# Patient Record
Sex: FEMALE
[De-identification: names, ages, dates, MRNs, and addresses within clinical notes are randomized per-mention and may not be internally consistent; named-entity substitution may affect disease eponyms.]

---

## 2018-05-21 ENCOUNTER — APPOINTMENT (OUTPATIENT)
Dept: UROLOGY | Facility: CLINIC | Age: 33
End: 2018-05-21

## 2020-06-16 ENCOUNTER — APPOINTMENT (OUTPATIENT)
Dept: INTERNAL MEDICINE | Facility: CLINIC | Age: 35
End: 2020-06-16
Payer: COMMERCIAL

## 2020-06-16 VITALS
HEIGHT: 68 IN | TEMPERATURE: 98.7 F | HEART RATE: 86 BPM | BODY MASS INDEX: 30.01 KG/M2 | DIASTOLIC BLOOD PRESSURE: 84 MMHG | SYSTOLIC BLOOD PRESSURE: 118 MMHG | WEIGHT: 198 LBS | RESPIRATION RATE: 16 BRPM | OXYGEN SATURATION: 97 %

## 2020-06-16 DIAGNOSIS — F19.11 OTHER PSYCHOACTIVE SUBSTANCE ABUSE, IN REMISSION: ICD-10-CM

## 2020-06-16 PROCEDURE — 99385 PREV VISIT NEW AGE 18-39: CPT

## 2020-06-16 RX ORDER — SERTRALINE HYDROCHLORIDE 100 MG/1
100 TABLET, FILM COATED ORAL DAILY
Refills: 0 | Status: ACTIVE | COMMUNITY

## 2020-06-16 NOTE — ASSESSMENT
[FreeTextEntry1] : 1.anxiety disorder with history of substance abuse: Continue on Zoloft 150 mg daily, follow up with psychiatry. Continue seeing therapist.\par \par 2.Infection with novel 2019 coronavirus: Now recovered. Patient will go for antibody testing tomorrow.\par \par 3.psoriasis: Continue on tacrolimus and estrogen cream per Derm. \par \par 4.health maintenance: Discussed blood work to get.

## 2020-06-16 NOTE — HISTORY OF PRESENT ILLNESS
[de-identified] : Patient is a 35-year-old female history of anxiety disorder, panic attacks, history of substance abuse, psoriasis, recent covid 19 infection comes in to establish care. 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 In early April she had symptoms of fever, fatigue, headache as well as stomach upset. Her symptoms lasted 9 days and she was able to recover at home.\par Her  also has a history of substance abuse and he recently had a relapse which is causing a great amount of stress for her at home. She also helps to take care of her stepdaughter who is 13 years old. Patient does continue to go to her support meetings and sees a therapist on a weekly basis. She also follow with a psychiatrist Dr. Fritz Rankin as well as a dermatologist Dr. Kristina Llanes. Patient recently had a flare of psoriasis on her vagina.\par Patient notes a 30 pound weight gain over the last 2 years. She states she noticed more of the weight gain after she started taking Zoloft. However she does admit to a lack of exercise and uncontrolled diet at times\par Patient denies any cp, sob,abdominal pain, nausea, vomiting, palpitations, fever, chills, constipation, diarrhea.\par  [FreeTextEntry1] : Patient comes in to establish care and annual exam.\par

## 2020-06-16 NOTE — HEALTH RISK ASSESSMENT
[No] : In the past 12 months have you used drugs other than those required for medical reasons? No [0] : 1) Little interest or pleasure doing things: Not at all (0) [Smoke Detector] : smoke detector [Carbon Monoxide Detector] : carbon monoxide detector [Safety elements used in home] : safety elements used in home [Seat Belt] :  uses seat belt [Sunscreen] : uses sunscreen [Patient reported PAP Smear was normal] : Patient reported PAP Smear was normal [] :  [# Of Children ___] : has [unfilled] children [Fully functional (bathing, dressing, toileting, transferring, walking, feeding)] : Fully functional (bathing, dressing, toileting, transferring, walking, feeding) [Fully functional (using the telephone, shopping, preparing meals, housekeeping, doing laundry, using] : Fully functional and needs no help or supervision to perform IADLs (using the telephone, shopping, preparing meals, housekeeping, doing laundry, using transportation, managing medications and managing finances) [] : No [MLV2Lfghh] : 0 [PapSmearDate] : 2019 [Guns at Home] : no guns at home [FreeTextEntry3] : 1 step daughter.

## 2020-06-16 NOTE — PAST MEDICAL HISTORY
[Menarche Age ____] : age at menarche was [unfilled] [Definite ___ (Date)] : the last menstrual period was [unfilled] [Regular Cycle Intervals] : have been regular [Total Preg ___] : G[unfilled] [Abortions ___] : Abortions:[unfilled] [Living ___] : Living: [unfilled]

## 2020-10-05 ENCOUNTER — APPOINTMENT (OUTPATIENT)
Dept: OBGYN | Facility: CLINIC | Age: 35
End: 2020-10-05
Payer: COMMERCIAL

## 2020-10-05 VITALS
BODY MASS INDEX: 28.04 KG/M2 | WEIGHT: 185 LBS | HEIGHT: 68 IN | SYSTOLIC BLOOD PRESSURE: 110 MMHG | DIASTOLIC BLOOD PRESSURE: 70 MMHG

## 2020-10-05 DIAGNOSIS — N97.9 FEMALE INFERTILITY, UNSPECIFIED: ICD-10-CM

## 2020-10-05 DIAGNOSIS — Z01.419 ENCOUNTER FOR GYNECOLOGICAL EXAMINATION (GENERAL) (ROUTINE) W/OUT ABNORMAL FINDINGS: ICD-10-CM

## 2020-10-05 PROCEDURE — 99385 PREV VISIT NEW AGE 18-39: CPT

## 2020-10-06 LAB — HPV HIGH+LOW RISK DNA PNL CVX: NOT DETECTED

## 2020-10-08 ENCOUNTER — APPOINTMENT (OUTPATIENT)
Dept: ULTRASOUND IMAGING | Facility: CLINIC | Age: 35
End: 2020-10-08

## 2020-10-08 ENCOUNTER — OUTPATIENT (OUTPATIENT)
Dept: OUTPATIENT SERVICES | Facility: HOSPITAL | Age: 35
LOS: 1 days | End: 2020-10-08
Payer: COMMERCIAL

## 2020-10-08 DIAGNOSIS — N97.9 FEMALE INFERTILITY, UNSPECIFIED: ICD-10-CM

## 2020-10-08 PROCEDURE — 76830 TRANSVAGINAL US NON-OB: CPT | Mod: 26

## 2020-10-08 PROCEDURE — 76830 TRANSVAGINAL US NON-OB: CPT

## 2020-10-28 ENCOUNTER — TRANSCRIPTION ENCOUNTER (OUTPATIENT)
Age: 35
End: 2020-10-28

## 2020-10-29 LAB
ESTRADIOL SERPL-MCNC: 48 PG/ML
FSH SERPL-MCNC: 3.8 IU/L
HCG SERPL-MCNC: <1 MIU/ML
PROLACTIN SERPL-MCNC: 31.6 NG/ML
TSH SERPL-ACNC: 2.48 UIU/ML

## 2020-11-03 LAB — ANTI-MUELLERIAN HORMONE: 1.58 NG/ML

## 2020-11-04 ENCOUNTER — OUTPATIENT (OUTPATIENT)
Dept: OUTPATIENT SERVICES | Facility: HOSPITAL | Age: 35
LOS: 1 days | End: 2020-11-04

## 2020-11-04 ENCOUNTER — APPOINTMENT (OUTPATIENT)
Dept: INTERVENTIONAL RADIOLOGY/VASCULAR | Facility: CLINIC | Age: 35
End: 2020-11-04
Payer: COMMERCIAL

## 2020-11-04 PROCEDURE — 58340 CATHETER FOR HYSTEROGRAPHY: CPT

## 2020-11-04 PROCEDURE — 74740 X-RAY FEMALE GENITAL TRACT: CPT | Mod: 26

## 2020-11-09 ENCOUNTER — NON-APPOINTMENT (OUTPATIENT)
Age: 35
End: 2020-11-09

## 2020-11-17 ENCOUNTER — NON-APPOINTMENT (OUTPATIENT)
Age: 35
End: 2020-11-17

## 2020-11-23 ENCOUNTER — NON-APPOINTMENT (OUTPATIENT)
Age: 35
End: 2020-11-23

## 2020-11-23 ENCOUNTER — APPOINTMENT (OUTPATIENT)
Dept: OBGYN | Facility: CLINIC | Age: 35
End: 2020-11-23
Payer: COMMERCIAL

## 2020-11-23 VITALS
WEIGHT: 185 LBS | HEIGHT: 68 IN | BODY MASS INDEX: 28.04 KG/M2 | SYSTOLIC BLOOD PRESSURE: 118 MMHG | DIASTOLIC BLOOD PRESSURE: 70 MMHG

## 2020-11-23 DIAGNOSIS — N97.9 FEMALE INFERTILITY, UNSPECIFIED: ICD-10-CM

## 2020-11-23 PROCEDURE — 99213 OFFICE O/P EST LOW 20 MIN: CPT

## 2020-12-03 ENCOUNTER — NON-APPOINTMENT (OUTPATIENT)
Age: 35
End: 2020-12-03

## 2020-12-04 DIAGNOSIS — R79.89 OTHER SPECIFIED ABNORMAL FINDINGS OF BLOOD CHEMISTRY: ICD-10-CM

## 2020-12-23 PROBLEM — Z01.419 ENCOUNTER FOR ANNUAL ROUTINE GYNECOLOGICAL EXAMINATION: Status: RESOLVED | Noted: 2020-10-05 | Resolved: 2020-12-23

## 2021-02-01 ENCOUNTER — APPOINTMENT (OUTPATIENT)
Dept: OBGYN | Facility: CLINIC | Age: 36
End: 2021-02-01

## 2021-02-09 ENCOUNTER — APPOINTMENT (OUTPATIENT)
Dept: INTERNAL MEDICINE | Facility: CLINIC | Age: 36
End: 2021-02-09
Payer: COMMERCIAL

## 2021-02-09 ENCOUNTER — NON-APPOINTMENT (OUTPATIENT)
Age: 36
End: 2021-02-09

## 2021-02-09 ENCOUNTER — EMERGENCY (EMERGENCY)
Facility: HOSPITAL | Age: 36
LOS: 0 days | Discharge: HOME | End: 2021-02-09
Attending: EMERGENCY MEDICINE
Payer: COMMERCIAL

## 2021-02-09 VITALS
DIASTOLIC BLOOD PRESSURE: 76 MMHG | OXYGEN SATURATION: 99 % | TEMPERATURE: 98 F | SYSTOLIC BLOOD PRESSURE: 104 MMHG | RESPIRATION RATE: 16 BRPM | HEART RATE: 75 BPM

## 2021-02-09 VITALS — WEIGHT: 190.04 LBS | HEIGHT: 68 IN

## 2021-02-09 DIAGNOSIS — R20.2 PARESTHESIA OF SKIN: ICD-10-CM

## 2021-02-09 DIAGNOSIS — R52 PAIN, UNSPECIFIED: ICD-10-CM

## 2021-02-09 DIAGNOSIS — Z86.2 PERSONAL HISTORY OF DISEASES OF THE BLOOD AND BLOOD-FORMING ORGANS AND CERTAIN DISORDERS INVOLVING THE IMMUNE MECHANISM: ICD-10-CM

## 2021-02-09 DIAGNOSIS — R51.9 HEADACHE, UNSPECIFIED: ICD-10-CM

## 2021-02-09 DIAGNOSIS — R11.0 NAUSEA: ICD-10-CM

## 2021-02-09 DIAGNOSIS — E86.0 DEHYDRATION: ICD-10-CM

## 2021-02-09 DIAGNOSIS — N92.0 EXCESSIVE AND FREQUENT MENSTRUATION WITH REGULAR CYCLE: ICD-10-CM

## 2021-02-09 DIAGNOSIS — D64.9 ANEMIA, UNSPECIFIED: ICD-10-CM

## 2021-02-09 DIAGNOSIS — R00.2 PALPITATIONS: ICD-10-CM

## 2021-02-09 DIAGNOSIS — Z20.822 CONTACT WITH AND (SUSPECTED) EXPOSURE TO COVID-19: ICD-10-CM

## 2021-02-09 DIAGNOSIS — D35.2 BENIGN NEOPLASM OF PITUITARY GLAND: ICD-10-CM

## 2021-02-09 LAB
ALBUMIN SERPL ELPH-MCNC: 4.1 G/DL — SIGNIFICANT CHANGE UP (ref 3.3–5)
ALP SERPL-CCNC: 61 U/L — SIGNIFICANT CHANGE UP (ref 40–120)
ALT FLD-CCNC: 21 U/L — SIGNIFICANT CHANGE UP (ref 12–78)
ANION GAP SERPL CALC-SCNC: 6 MMOL/L — SIGNIFICANT CHANGE UP (ref 5–17)
APTT BLD: 32.6 SEC — SIGNIFICANT CHANGE UP (ref 27.5–35.5)
AST SERPL-CCNC: 13 U/L — LOW (ref 15–37)
BASOPHILS # BLD AUTO: 0.04 K/UL — SIGNIFICANT CHANGE UP (ref 0–0.2)
BASOPHILS NFR BLD AUTO: 0.7 % — SIGNIFICANT CHANGE UP (ref 0–2)
BILIRUB SERPL-MCNC: 0.4 MG/DL — SIGNIFICANT CHANGE UP (ref 0.2–1.2)
BUN SERPL-MCNC: 12 MG/DL — SIGNIFICANT CHANGE UP (ref 7–23)
CALCIUM SERPL-MCNC: 8.9 MG/DL — SIGNIFICANT CHANGE UP (ref 8.5–10.1)
CHLORIDE SERPL-SCNC: 108 MMOL/L — SIGNIFICANT CHANGE UP (ref 96–108)
CO2 SERPL-SCNC: 25 MMOL/L — SIGNIFICANT CHANGE UP (ref 22–31)
CREAT SERPL-MCNC: 0.91 MG/DL — SIGNIFICANT CHANGE UP (ref 0.5–1.3)
EOSINOPHIL # BLD AUTO: 0.09 K/UL — SIGNIFICANT CHANGE UP (ref 0–0.5)
EOSINOPHIL NFR BLD AUTO: 1.6 % — SIGNIFICANT CHANGE UP (ref 0–6)
GLUCOSE SERPL-MCNC: 95 MG/DL — SIGNIFICANT CHANGE UP (ref 70–99)
HCG SERPL-ACNC: <1 MIU/ML — SIGNIFICANT CHANGE UP
HCT VFR BLD CALC: 38.5 % — SIGNIFICANT CHANGE UP (ref 34.5–45)
HGB BLD-MCNC: 12.7 G/DL — SIGNIFICANT CHANGE UP (ref 11.5–15.5)
IMM GRANULOCYTES NFR BLD AUTO: 0.5 % — SIGNIFICANT CHANGE UP (ref 0–1.5)
INR BLD: 1.07 RATIO — SIGNIFICANT CHANGE UP (ref 0.88–1.16)
LYMPHOCYTES # BLD AUTO: 1.85 K/UL — SIGNIFICANT CHANGE UP (ref 1–3.3)
LYMPHOCYTES # BLD AUTO: 33.5 % — SIGNIFICANT CHANGE UP (ref 13–44)
MCHC RBC-ENTMCNC: 28.7 PG — SIGNIFICANT CHANGE UP (ref 27–34)
MCHC RBC-ENTMCNC: 33 GM/DL — SIGNIFICANT CHANGE UP (ref 32–36)
MCV RBC AUTO: 86.9 FL — SIGNIFICANT CHANGE UP (ref 80–100)
MONOCYTES # BLD AUTO: 0.38 K/UL — SIGNIFICANT CHANGE UP (ref 0–0.9)
MONOCYTES NFR BLD AUTO: 6.9 % — SIGNIFICANT CHANGE UP (ref 2–14)
NEUTROPHILS # BLD AUTO: 3.13 K/UL — SIGNIFICANT CHANGE UP (ref 1.8–7.4)
NEUTROPHILS NFR BLD AUTO: 56.8 % — SIGNIFICANT CHANGE UP (ref 43–77)
PLATELET # BLD AUTO: 267 K/UL — SIGNIFICANT CHANGE UP (ref 150–400)
POTASSIUM SERPL-MCNC: 3.9 MMOL/L — SIGNIFICANT CHANGE UP (ref 3.5–5.3)
POTASSIUM SERPL-SCNC: 3.9 MMOL/L — SIGNIFICANT CHANGE UP (ref 3.5–5.3)
PROT SERPL-MCNC: 8.1 GM/DL — SIGNIFICANT CHANGE UP (ref 6–8.3)
PROTHROM AB SERPL-ACNC: 12.5 SEC — SIGNIFICANT CHANGE UP (ref 10.6–13.6)
RBC # BLD: 4.43 M/UL — SIGNIFICANT CHANGE UP (ref 3.8–5.2)
RBC # FLD: 12.7 % — SIGNIFICANT CHANGE UP (ref 10.3–14.5)
SARS-COV-2 RNA SPEC QL NAA+PROBE: SIGNIFICANT CHANGE UP
SODIUM SERPL-SCNC: 139 MMOL/L — SIGNIFICANT CHANGE UP (ref 135–145)
WBC # BLD: 5.52 K/UL — SIGNIFICANT CHANGE UP (ref 3.8–10.5)
WBC # FLD AUTO: 5.52 K/UL — SIGNIFICANT CHANGE UP (ref 3.8–10.5)

## 2021-02-09 PROCEDURE — 85025 COMPLETE CBC W/AUTO DIFF WBC: CPT

## 2021-02-09 PROCEDURE — 36415 COLL VENOUS BLD VENIPUNCTURE: CPT

## 2021-02-09 PROCEDURE — 99284 EMERGENCY DEPT VISIT MOD MDM: CPT

## 2021-02-09 PROCEDURE — 93010 ELECTROCARDIOGRAM REPORT: CPT

## 2021-02-09 PROCEDURE — 99283 EMERGENCY DEPT VISIT LOW MDM: CPT | Mod: 25

## 2021-02-09 PROCEDURE — 85610 PROTHROMBIN TIME: CPT

## 2021-02-09 PROCEDURE — 86850 RBC ANTIBODY SCREEN: CPT

## 2021-02-09 PROCEDURE — 96361 HYDRATE IV INFUSION ADD-ON: CPT

## 2021-02-09 PROCEDURE — 80053 COMPREHEN METABOLIC PANEL: CPT

## 2021-02-09 PROCEDURE — U0005: CPT

## 2021-02-09 PROCEDURE — 96360 HYDRATION IV INFUSION INIT: CPT

## 2021-02-09 PROCEDURE — 86900 BLOOD TYPING SEROLOGIC ABO: CPT

## 2021-02-09 PROCEDURE — 86901 BLOOD TYPING SEROLOGIC RH(D): CPT

## 2021-02-09 PROCEDURE — 99213 OFFICE O/P EST LOW 20 MIN: CPT | Mod: 95

## 2021-02-09 PROCEDURE — U0003: CPT

## 2021-02-09 PROCEDURE — 85730 THROMBOPLASTIN TIME PARTIAL: CPT

## 2021-02-09 PROCEDURE — 93005 ELECTROCARDIOGRAM TRACING: CPT

## 2021-02-09 PROCEDURE — 84702 CHORIONIC GONADOTROPIN TEST: CPT

## 2021-02-09 RX ORDER — SODIUM CHLORIDE 9 MG/ML
1000 INJECTION INTRAMUSCULAR; INTRAVENOUS; SUBCUTANEOUS ONCE
Refills: 0 | Status: COMPLETED | OUTPATIENT
Start: 2021-02-09 | End: 2021-02-09

## 2021-02-09 RX ADMIN — SODIUM CHLORIDE 1000 MILLILITER(S): 9 INJECTION INTRAMUSCULAR; INTRAVENOUS; SUBCUTANEOUS at 15:29

## 2021-02-09 RX ADMIN — SODIUM CHLORIDE 2000 MILLILITER(S): 9 INJECTION INTRAMUSCULAR; INTRAVENOUS; SUBCUTANEOUS at 13:31

## 2021-02-09 RX ADMIN — SODIUM CHLORIDE 1000 MILLILITER(S): 9 INJECTION INTRAMUSCULAR; INTRAVENOUS; SUBCUTANEOUS at 14:29

## 2021-02-09 NOTE — ED STATDOCS - NSFOLLOWUPINSTRUCTIONS_ED_ALL_ED_FT
Dehydration    AMBULATORY CARE:    Dehydration is a condition that develops when your body does not have enough fluid. You may become dehydrated if you do not drink enough water or lose too much fluid. Fluid loss may also cause loss of electrolytes (minerals), such as sodium.    Common symptoms include the following:   •Dry eyes or mouth      •Increased thirst      •Dark yellow urine      •Urinating little or not at all      •Tiredness or body weakness      •Headache, dizziness, or confusion      •Irregular or fast breathing, fast or pounding heartbeat, and low blood pressure      •Sudden weight loss      Seek care immediately if:   •You have a seizure.      •You are confused or cannot think clearly.      •You are extremely sleepy, or another person cannot wake you.       •You become dizzy or faint when you stand.      •You are not able to urinate.      •You have trouble breathing.      •You have a fast or irregular heartbeat.      •Your hands or feet are cold, or your face is pale.       Contact your healthcare provider if:   •You have trouble drinking liquids because you are vomiting.      •Your symptoms get worse.      •You have a fever.       •You feel very weak or tired.      •You have questions or concerns about your condition or care.      Treatment for dehydration may include any of the following:   •Oral liquids: ?If you are mildly to moderately dehydrated, you may need an oral rehydration solution (ORS). This drink contains the right amount of salt, sugar, and minerals in water to replace body fluids. Ask your healthcare provider where you can get an ORS.       ?Drink an ORS in small amounts if you have been vomiting. If you vomit, wait 30 minutes and try again. Ask healthcare providers how much ORS you need when you are dehydrated and how often you should drink it.       ?A sports drink is not  the same as an ORS. Do not drink sports drinks without asking your healthcare provider.      ?Do not drink soft drinks or fruit juices. These can make your condition worse.       •You may receive fluid through an IV. Electrolytes may also be included in the fluid.      •Hypodermoclysis gives your body a large amount of water quickly. The water is given into the deepest layer of your skin. Ask your healthcare provider for more information about hypodermoclysis.      Prevent or manage dehydration:   •Drink liquids as directed. Liquids that contain water, sugar, and minerals can help your body hold in fluid and help prevent dehydration. Drink liquids throughout the day, not just when you feel thirsty. Men should drink about 3 liters (13 eight-ounce cups) of liquid each day. Women should drink about 2 liters (9 eight-ounce cups) of liquid each day. Drink even more liquid if you will be outdoors, in the sun for a long time, or exercising.       •Stay cool. Limit the time you spend outdoors during the hottest part of the day. Dress in lightweight clothes.       •Keep track of how often you urinate. If you urinate less than usual or your urine is darker, drink more liquids.      Follow up with your healthcare provider as directed: Write down your questions so you remember to ask them during your visits.

## 2021-02-09 NOTE — ED STATDOCS - ATTENDING CONTRIBUTION TO CARE
I, Zac Barker MD,  performed the initial face to face bedside interview with this patient regarding history of present illness, review of symptoms and relevant past medical, social and family history.  I completed an independent physical examination.  I was the initial provider who evaluated this patient. I have signed out the follow up of any pending tests (i.e. labs, radiological studies) to the ACP.  I have communicated the patient’s plan of care and disposition with the ACP.  The history, relevant review of systems, past medical and surgical history, medical decision making, and physical examination was documented by the scribe in my presence and I attest to the accuracy of the documentation.

## 2021-02-09 NOTE — ED ADULT NURSE NOTE - CHIEF COMPLAINT QUOTE
heavy vaginal bleeding x 4 days. Pt c/o dizziness, nausea, chest tightness, headache and bodyaches x 5 days. Denies abd pain, fever/chills.

## 2021-02-09 NOTE — ASSESSMENT
[FreeTextEntry1] : 1.dizziness/fatigue: With recently heavy menses. Discussed with patient that she could be anemic and that we will need to obtain CBC stat, recent hemoglobin stable done last summer. Discussed signs and symptoms to warrant urgent evaluation. Will discuss treatment options once cbc resulted. \par Rapid covid testing done on Friday at [facility] urgent care was negative. Discussed obtaining covid PCR nasal swab.

## 2021-02-09 NOTE — ED STATDOCS - PROGRESS NOTE DETAILS
Pt. is a 37 y/o female  presents to the ED sent by PMD to evaluate for anemia. Pt had recent heavy period that she needed to change a pad every 40 minutes for 2 days. States her period started on 02/01 and ended yesterday. Pt c/o body aches, hand tingling, headache, palpitations, and mild nausea.  Pt. sent for evaluation for anemia from telehealth appointment.  last H&H 11/37 on 2/4.  Jocelyn Gillespie PA-C H/H 12/38 improved since last week.  Pt. feeling very thirsty. Will add another liter of IVF.  Jocelyn Gillespie PA-C Labs unremarkable.  Pt. received 2l IVF,  Return precautions provided.  To follow with PMD.  Jocelyn Gillespie PA-C

## 2021-02-09 NOTE — ED STATDOCS - CARE PLAN
Principal Discharge DX:	Dehydration   Principal Discharge DX:	Dehydration  Secondary Diagnosis:	Menorrhagia with regular cycle

## 2021-02-09 NOTE — ED ADULT NURSE NOTE - OBJECTIVE STATEMENT
Pt presents to ED for dizziness, weakness, headache, and chest tightness x 5 days. Pt states that she has been having menorrhagia for the last year but this past month was even heavier. Her period started 2/1 and ended yesterday 2/8. Pt states she had a tele-med appt with her PCP who suggested she might be anemic and suggested she should come to the ER. Pt denies fever or any sick contacts.

## 2021-02-09 NOTE — ED STATDOCS - OBJECTIVE STATEMENT
37 y/o female recently dx with a pituitary tumor a few months ago placed on medication, presents to the ED sent by PMD to evaluate for anemia. Pt had recent heavy period that she needed to change a pad every 40 minutes for 2 days. States her period started on 02/01 and ended yesterday. Pt c/o body aches, hand tingling, headache, palpitations, and mild nausea. Had a tele health visit with PMD, was told she appears pale and sent to the ED. No hx of abd surgeries.   OBGYN: Dr. Elijah Ledesma

## 2021-02-09 NOTE — ED STATDOCS - CLINICAL SUMMARY MEDICAL DECISION MAKING FREE TEXT BOX
pt sent to ER to check for anemia, heavier bleeding than usual, no vaginal bleed for 24 hours, check labs pt sent to ER to check for anemia, heavier bleeding than usual, no vaginal bleed for 24 hours, check labs          Labs unremarkable.  Pt. received 1L NS.  Pt. to follow with PMD.  Return precautions provided.  Jocelyn Gillespie PA-C

## 2021-02-09 NOTE — ED STATDOCS - PATIENT PORTAL LINK FT
You can access the FollowMyHealth Patient Portal offered by [facility] by registering at the following website: [email or web address]. By joining [email or web address]’s FollowMyHealth portal, you will also be able to view your health information using other applications (apps) compatible with our system.

## 2021-02-09 NOTE — HISTORY OF PRESENT ILLNESS
[FreeTextEntry8] : Ms. DIYA IZAGUIRRE is a 36 year F who calls in for an acute visit.\par Patient is a 36 year old female calling in with complaints of body aches, headache, lethargy and dizziness. Her symptoms started since this past Thursday. She got her period last week around every first or second and it was much heavier bleeding. She had heaviest flow on February 6 she was going through one tampon every 45 minutes. Her bleeding stopped yesterday. She's had a regular and heavier menses for the past one year but it was never as heavy.\par She is going through fertility workup to try to conceive. She was also diagnosed with pituitary microadenoma on MRI of the brain and is now on Cabergoline 5 mg half a tablet twice a week for the past 2 months. No other new medications. \par Patient denies any cp, sob,abdominal pain, nausea, vomiting, palpitations, fever, chills, constipation, diarrhea.\par

## 2021-02-09 NOTE — ED STATDOCS - CARE PROVIDER_API CALL
Bibi Nava)  Obstetrics and Gynecology  [address] [place] [address]  Phone: [phone]  Fax: [phone]  Follow Up Time:

## 2021-02-11 ENCOUNTER — TRANSCRIPTION ENCOUNTER (OUTPATIENT)
Age: 36
End: 2021-02-11

## 2021-02-25 ENCOUNTER — APPOINTMENT (OUTPATIENT)
Dept: INTERNAL MEDICINE | Facility: CLINIC | Age: 36
End: 2021-02-25
Payer: COMMERCIAL

## 2021-02-25 DIAGNOSIS — M25.511 PAIN IN RIGHT SHOULDER: ICD-10-CM

## 2021-02-25 PROCEDURE — 99214 OFFICE O/P EST MOD 30 MIN: CPT | Mod: 95

## 2021-02-25 NOTE — ASSESSMENT
[FreeTextEntry1] : 1.right shoulder pain: After fall with pain and limited range of motion. Obtain right shoulder x-ray, start on Medrol Dosepak. Continue a TENS unit and icing. May need physical therapy versus orthopedic referral.\par \par

## 2021-02-25 NOTE — HISTORY OF PRESENT ILLNESS
[Home] : at home, [unfilled] , at the time of the visit. [Medical Office: ([facility])___] : at the medical office located in  [Verbal consent obtained from patient] : the patient, [unfilled] [FreeTextEntry8] : Ms. DIYA IZAGUIRRE is a 36 year F who calls in for an acute visit.\par Pt notes falling out of her car into her garage 3 months ago and landing on both of her outstretched hands. She bruised her left knee, no head trauma or LOC. She also had soreness of her right shoulder. Her shoulder did not improve and the pain worsened and she cannot lift her arm more than 90 degrees. She also has numbness/tingling of her right arm for the past 2 weeks. She has not taken anything for pain. Pain is daily and lifting any object and sleeping on shoulder makes symptoms worse. TENs unit and icing has helped symptoms.\par She has dry cough this morning, no covid exposure, negative covid test on 2/9 at  ED. \par She did go to [facility] emergency room due to her fatigue and all of her blood work was benign and EKG stable and she was told to hydrate herself.\par Patient denies any chest pain, shortness of breath, headache, abdominal pain, nausea, vomiting, palpitations, constipation, diarrhea, loss of sense of taste/smell.\par

## 2021-02-26 ENCOUNTER — APPOINTMENT (OUTPATIENT)
Dept: RADIOLOGY | Facility: CLINIC | Age: 36
End: 2021-02-26

## 2021-03-04 ENCOUNTER — APPOINTMENT (OUTPATIENT)
Dept: OBGYN | Facility: CLINIC | Age: 36
End: 2021-03-04

## 2021-08-12 ENCOUNTER — EMERGENCY (EMERGENCY)
Facility: HOSPITAL | Age: 36
LOS: 0 days | Discharge: HOME | End: 2021-08-12
Attending: EMERGENCY MEDICINE
Payer: COMMERCIAL

## 2021-08-12 VITALS — HEIGHT: 68 IN | WEIGHT: 190.04 LBS

## 2021-08-12 VITALS
RESPIRATION RATE: 18 BRPM | DIASTOLIC BLOOD PRESSURE: 65 MMHG | TEMPERATURE: 98 F | OXYGEN SATURATION: 98 % | HEART RATE: 76 BPM | SYSTOLIC BLOOD PRESSURE: 106 MMHG

## 2021-08-12 DIAGNOSIS — R21 RASH AND OTHER NONSPECIFIC SKIN ERUPTION: ICD-10-CM

## 2021-08-12 DIAGNOSIS — L20.9 ATOPIC DERMATITIS, UNSPECIFIED: ICD-10-CM

## 2021-08-12 PROCEDURE — 99283 EMERGENCY DEPT VISIT LOW MDM: CPT

## 2021-08-12 NOTE — ED ADULT NURSE NOTE - CHIEF COMPLAINT QUOTE
pt a/ox3, reports rash x4 days, worsening today. pt reports rash is generalized at this time, reports severe burning. pt reports hx of eczema, used previously prescribed topical steroid with no relief. pt denies fever,chills,SOB,CP, N/V/D, sick contacts.

## 2021-08-12 NOTE — ED PROVIDER NOTE - PATIENT PORTAL LINK FT
You can access the FollowMyHealth Patient Portal offered by [facility] by registering at the following website: [email or web address]. By joining [email or web address] FollowMyHealth portal, you will also be able to view your health information using other applications (apps) compatible with our system.

## 2021-08-12 NOTE — ED PROVIDER NOTE - OBJECTIVE STATEMENT
35 y/o female with a PMHx of presents to the ED c/o redness to b/l elbows and b/l upper thighs x1 week, worsening. Pt reports burning sensation to areas. Pt notes she has used Tacrolimus in the past for similar symptoms. Pt used Tacrolimus for recent symptoms without improvement. Denies fevers, chills. No new meds. No other complaints at this time.

## 2021-08-12 NOTE — ED PROVIDER NOTE - CLINICAL SUMMARY MEDICAL DECISION MAKING FREE TEXT BOX
Exam consistent with eczema/atopic dermatitis. No signs of infection. Will start on steroids. Pt otherwise well appearing. Pt d/c home with return precautions. Exam consistent with eczema/atopic dermatitis. No signs of infection. Will start on topical steroids.  Advised barrier creams as well. Pt otherwise well appearing. Pt d/c home with return precautions.

## 2021-08-12 NOTE — ED PROVIDER NOTE - NSFOLLOWUPINSTRUCTIONS_ED_ALL_ED_FT
Atopic Dermatitis      Atopic dermatitis is a skin disorder that causes inflammation of the skin. This is the most common type of eczema. Eczema is a group of skin conditions that cause the skin to be itchy, red, and swollen. This condition is generally worse during the cooler winter months and often improves during the warm summer months. Symptoms can vary from person to person.    Atopic dermatitis usually starts showing signs in infancy and can last through adulthood. This condition cannot be passed from one person to another (non-contagious), but it is more common in families. Atopic dermatitis may not always be present. When it is present, it is called a flare-up.      What are the causes?  The exact cause of this condition is not known. Flare-ups of the condition may be triggered by:  •Contact with something that you are sensitive or allergic to.      •Stress.      •Certain foods.      •Extremely hot or cold weather.      •Harsh chemicals and soaps.      •Dry air.      •Chlorine.        What increases the risk?    This condition is more likely to develop in people who have a personal history or family history of eczema, allergies, asthma, or hay fever.      What are the signs or symptoms?  Symptoms of this condition include:  •Dry, scaly skin.      •Red, itchy rash.      •Itchiness, which can be severe. This may occur before the skin rash. This can make sleeping difficult.      •Skin thickening and cracking that can occur over time.        How is this diagnosed?    This condition is diagnosed based on your symptoms, a medical history, and a physical exam.      How is this treated?  There is no cure for this condition, but symptoms can usually be controlled. Treatment focuses on:  •Controlling the itchiness and scratching. You may be given medicines, such as antihistamines or steroid creams.      •Limiting exposure to things that you are sensitive or allergic to (allergens).      •Recognizing situations that cause stress and developing a plan to manage stress.      If your atopic dermatitis does not get better with medicines, or if it is all over your body (widespread), a treatment using a specific type of light (phototherapy) may be used.      Follow these instructions at home:      Skin care    •Keep your skin well-moisturized. Doing this seals in moisture and helps to prevent dryness.  •Use unscented lotions that have petroleum in them.      •Avoid lotions that contain alcohol or water. They can dry the skin.      •Keep baths or showers short (less than 5 minutes) in warm water. Do not use hot water.  •Use mild, unscented cleansers for bathing. Avoid soap and bubble bath.      •Apply a moisturizer to your skin right after a bath or shower.        • Do not apply anything to your skin without checking with your health care provider.      General instructions     •Dress in clothes made of cotton or cotton blends. Dress lightly because heat increases itchiness.      •When washing your clothes, rinse your clothes twice so all of the soap is removed.      •Avoid any triggers that can cause a flare-up.      •Try to manage your stress.      •Keep your fingernails cut short.      •Avoid scratching. Scratching makes the rash and itchiness worse. It may also result in a skin infection (impetigo) due to a break in the skin caused by scratching.      •Take or apply over-the-counter and prescription medicines only as told by your health care provider.      •Keep all follow-up visits as told by your health care provider. This is important.      • Do not be around people who have cold sores or fever blisters. If you get the infection, it may cause your atopic dermatitis to worsen.        Contact a health care provider if:    •Your itchiness interferes with sleep.      •Your rash gets worse or it is not better within one week of starting treatment.      •You have a fever.      •You have a rash flare-up after having contact with someone who has cold sores or fever blisters.        Get help right away if:    •You develop pus or soft yellow scabs in the rash area.        Summary    •This condition causes a red rash and itchy, dry, scaly skin.      •Treatment focuses on controlling the itchiness and scratching, limiting exposure to things that you are sensitive or allergic to (allergens), recognizing situations that cause stress, and developing a plan to manage stress.      •Keep your skin well-moisturized.      •Keep baths or showers shorter than 5 minutes and use warm water. Do not use hot water.      This information is not intended to replace advice given to you by your health care provider. Make sure you discuss any questions you have with your health care provider.

## 2021-08-12 NOTE — ED ADULT TRIAGE NOTE - CHIEF COMPLAINT QUOTE
[facility]  [phone] Vitals   Pre   Post   Assessment   Pre   Post    
Temp  Temp: 98.5 °F (36.9 °C) (01/02/21 0845)  99.0 oral LOC  A & O x 4 No change HR   Pulse (Heart Rate): 70 (01/02/21 0900) 81 Lungs   Crackles lower LL  No change B/P   BP: (!) 144/85 (01/02/21 0900) 116/76 Cardiac   S1S2  No change Resp   Resp Rate: 21 (01/02/21 0900) 19 Skin   Warm, dry & Rt BKA,   No change Pain level  Pain Intensity 1: 0 (01/02/21 0805) 0 Edema  Generalized edema, facial +1 
+3 lower ext. No change Orders: Duration:   Start:    0845 End:    1245 Total:   4.0 Dialyzer:   Dialyzer/Set Up Inspection: Yany Duncan (01/02/21 0845) K Bath:   Dialysate K (mEq/L): 2 (01/02/21 0845) Ca Bath:   Dialysate CA (mEq/L): 2.5 (01/02/21 0845) Na/Bicarb:   Dialysate NA (mEq/L): 138 (01/02/21 0845) Target Fluid Removal:   Goal/Amount of Fluid to Remove (mL): 4000 mL (01/02/21 0845) Access  AVF Type & Location:   Left upper AVF Labs Obtained/Reviewed Critical Results Called   Date when labs were drawn- 
Hgb-   
HGB Date Value Ref Range Status 01/01/2021 11.3 (L) 12.1 - 17.0 g/dL Final  
 
K-   
Potassium Date Value Ref Range Status 01/01/2021 5.1 3.5 - 5.1 mmol/L Final  
 
Ca-  
Calcium Date Value Ref Range Status 01/01/2021 8.7 8.5 - 10.1 MG/DL Final  
 
Bun-  
BUN Date Value Ref Range Status 01/01/2021 71 (H) 6 - 20 MG/DL Final  
 
Creat-  
Creatinine Date Value Ref Range Status 01/01/2021 11.30 (H) 0.70 - 1.30 MG/DL Final  
 
  
Medications/ Blood Products Given Name   Dose   Route and Time    
none Blood Volume Processed (BVP):    99.5 Net Fluid Removed:  4500ml Comments RN reviewed LPN assessment and completed RN assessment. RN completed patient assessment. RN reviewed technicians vital signs and procedure note. Tx completed. Reviewed by DIONICIO Contreras Time Out Done: 4614 Primary Nurse Rpt Pre:DIONICIO Lazcano 
 Primary Nurse Rpt Post:DIONICIO Lazcano Pt Education:access care Care Plan: continue HD Tx Summary:PELON AVF: skin CDI. No s/s of infection. + B/T. No issues with cannulation or hemostasis. Running well at . Iarrived to pt's room A&Ox4. Consent signed & on file. SBAR received from Primary RN. 0845: Pt cannulated with 82Z needles per policy & without issue. Labs drawn per request/ order. VSS. Dialysis Tx initiated. 0900: Pt resting quietly. 0915: Pt. Stable, lines secure and visible 0930: Pt. Marcello. Hd well. Lines secure and visible 0945: Pt. Stable, lines secure 
1000: Pt. Stable, lines secure\ 
1015: Pt. Resting well. Lines secure 1030; Pt. Resting well. Lines secure no s/s of distress 1045: pt. Talking & watching tv 
1100: Pt. Stable, lines secure and visible 1115: Pt. Stable, lines secure and visible 1130: Pt. Talking on cell to family member 1145: pt. Stable, lines secure 
1200: pt. Marcello. Hd well. 1215: Pt. Stable, no s/s of distress 1230: Pt. Stable, lines secure 1245: Tx ended. VSS. All possible blood returned to patient. Hemostasis achieved without issue. Bed locked and in the lowest position, call bell and belongings in reach. SBAR given to Primary, RN. Patient is stable at time of their/ my departure. All Dialysis related medications have been reviewed. Admiting Diagnosis:COVID, Hyperkalemia, COVID + Hypoxia HTN, uncontrolled Pt's previous clinic- [address] 
Consent signed - Informed Consent Verified: Yes (01/02/21 0845) Sylvain Consent - verified Hepatitis Status- Negative on 12/26/20 Machine #- Machine Number: [id number] (01/02/21 0845) Telemetry status- yes Pre-dialysis wt. -   
  
 
 pt a/ox3, reports rash x4 days, worsening today. pt reports rash is generalized at this time, reports severe burning. pt reports hx of eczema, used previously prescribed topical steroid with no relief. pt denies fever,chills,SOB,CP, N/V/D, sick contacts.

## 2022-01-01 ENCOUNTER — OUTPATIENT (OUTPATIENT)
Dept: OUTPATIENT SERVICES | Facility: HOSPITAL | Age: 37
LOS: 1 days | End: 2022-01-01
Payer: COMMERCIAL

## 2022-01-01 DIAGNOSIS — Z20.828 CONTACT WITH AND (SUSPECTED) EXPOSURE TO OTHER VIRAL COMMUNICABLE DISEASES: ICD-10-CM

## 2022-01-01 LAB — SARS-COV-2 RNA SPEC QL NAA+PROBE: DETECTED

## 2022-01-01 PROCEDURE — U0003: CPT

## 2022-01-01 PROCEDURE — U0005: CPT

## 2022-01-01 PROCEDURE — C9803: CPT

## 2022-01-02 DIAGNOSIS — Z20.828 CONTACT WITH AND (SUSPECTED) EXPOSURE TO OTHER VIRAL COMMUNICABLE DISEASES: ICD-10-CM

## 2022-01-03 ENCOUNTER — NON-APPOINTMENT (OUTPATIENT)
Age: 37
End: 2022-01-03

## 2022-01-04 ENCOUNTER — APPOINTMENT (OUTPATIENT)
Dept: INTERNAL MEDICINE | Facility: CLINIC | Age: 37
End: 2022-01-04
Payer: COMMERCIAL

## 2022-01-04 DIAGNOSIS — U07.1 COVID-19: ICD-10-CM

## 2022-01-04 PROCEDURE — 99442: CPT

## 2022-01-04 NOTE — HISTORY OF PRESENT ILLNESS
[Home] : at home, [unfilled] , at the time of the visit. [Medical Office: ([facility])___] : at the medical office located in  [Verbal consent obtained from patient] : the patient, [unfilled] [FreeTextEntry1] : Covid19 [de-identified] : Ms. DIYA IZAGUIRRE is a 36 year F who calls in for an acute visit.\par Pt notes sx of sore throat 12/31/21 and that evening had chills and next morning went to  drive up and waited 3 hrs for PCR testing and covid PCR test was positive and she did home test on 1/1/21 that was positive. She notes sore throat has worsened, body aches, cough and congestion, headache and nausea with no vomiting.  She has not had fever yesterday morning of 101.6 F. She is fully vaccinated with Pfizer and had 12/4/21 Moderna booster vaccine. She is quarantining at her sister's home and her sister, boyfriend all negative. \par She has been taking Mucinex, Tylenol and cough drops/tea. \par Patient denies any chest pain, shortness of breath, abdominal pain, vomiting, palpitations, constipation, diarrhea, loss of sense of taste/smell.\par

## 2022-01-04 NOTE — ASSESSMENT
[FreeTextEntry1] : 1. Covid 19 infection: Advised to quarantine until 72 hours fever free without medication, 10 days from onset of symptoms and with improvement of symptoms.\par Will rx cough with medrol dose torie and tessalon capsules.\par Went over instructions and side effects of medication.\par Will need letter for work as well. \par Take Tylenol or ibuprofen as needed for fever/body aches. \par Rest, continue to hydrate.\par Call for new or worsening symptoms.\par For any shortness of breath or symptoms of distress report to Emergency Room ASAP. \par \par

## 2022-01-05 ENCOUNTER — NON-APPOINTMENT (OUTPATIENT)
Age: 37
End: 2022-01-05

## 2022-03-02 ENCOUNTER — EMERGENCY (EMERGENCY)
Facility: HOSPITAL | Age: 37
LOS: 0 days | Discharge: HOME | End: 2022-03-02
Attending: EMERGENCY MEDICINE
Payer: COMMERCIAL

## 2022-03-02 VITALS
OXYGEN SATURATION: 100 % | TEMPERATURE: 98 F | DIASTOLIC BLOOD PRESSURE: 66 MMHG | SYSTOLIC BLOOD PRESSURE: 112 MMHG | HEART RATE: 60 BPM | RESPIRATION RATE: 18 BRPM

## 2022-03-02 VITALS — HEIGHT: 68 IN | WEIGHT: 192.9 LBS

## 2022-03-02 DIAGNOSIS — R10.13 EPIGASTRIC PAIN: ICD-10-CM

## 2022-03-02 LAB
ALBUMIN SERPL ELPH-MCNC: 3.6 G/DL — SIGNIFICANT CHANGE UP (ref 3.3–5)
ALP SERPL-CCNC: 42 U/L — SIGNIFICANT CHANGE UP (ref 40–120)
ALT FLD-CCNC: 35 U/L — SIGNIFICANT CHANGE UP (ref 12–78)
ANION GAP SERPL CALC-SCNC: 4 MMOL/L — LOW (ref 5–17)
AST SERPL-CCNC: 23 U/L — SIGNIFICANT CHANGE UP (ref 15–37)
BASOPHILS # BLD AUTO: 0.05 K/UL — SIGNIFICANT CHANGE UP (ref 0–0.2)
BASOPHILS NFR BLD AUTO: 0.9 % — SIGNIFICANT CHANGE UP (ref 0–2)
BILIRUB SERPL-MCNC: 0.5 MG/DL — SIGNIFICANT CHANGE UP (ref 0.2–1.2)
BUN SERPL-MCNC: 11 MG/DL — SIGNIFICANT CHANGE UP (ref 7–23)
CALCIUM SERPL-MCNC: 9.4 MG/DL — SIGNIFICANT CHANGE UP (ref 8.5–10.1)
CHLORIDE SERPL-SCNC: 108 MMOL/L — SIGNIFICANT CHANGE UP (ref 96–108)
CO2 SERPL-SCNC: 25 MMOL/L — SIGNIFICANT CHANGE UP (ref 22–31)
CREAT SERPL-MCNC: 0.97 MG/DL — SIGNIFICANT CHANGE UP (ref 0.5–1.3)
EGFR: 78 ML/MIN/1.73M2 — SIGNIFICANT CHANGE UP
EOSINOPHIL # BLD AUTO: 0.06 K/UL — SIGNIFICANT CHANGE UP (ref 0–0.5)
EOSINOPHIL NFR BLD AUTO: 1.1 % — SIGNIFICANT CHANGE UP (ref 0–6)
GLUCOSE SERPL-MCNC: 91 MG/DL — SIGNIFICANT CHANGE UP (ref 70–99)
HCT VFR BLD CALC: 40.4 % — SIGNIFICANT CHANGE UP (ref 34.5–45)
HGB BLD-MCNC: 13.4 G/DL — SIGNIFICANT CHANGE UP (ref 11.5–15.5)
IMM GRANULOCYTES NFR BLD AUTO: 0.4 % — SIGNIFICANT CHANGE UP (ref 0–1.5)
LIDOCAIN IGE QN: 89 U/L — SIGNIFICANT CHANGE UP (ref 73–393)
LYMPHOCYTES # BLD AUTO: 1.72 K/UL — SIGNIFICANT CHANGE UP (ref 1–3.3)
LYMPHOCYTES # BLD AUTO: 31.3 % — SIGNIFICANT CHANGE UP (ref 13–44)
MCHC RBC-ENTMCNC: 29.6 PG — SIGNIFICANT CHANGE UP (ref 27–34)
MCHC RBC-ENTMCNC: 33.2 GM/DL — SIGNIFICANT CHANGE UP (ref 32–36)
MCV RBC AUTO: 89.2 FL — SIGNIFICANT CHANGE UP (ref 80–100)
MONOCYTES # BLD AUTO: 0.51 K/UL — SIGNIFICANT CHANGE UP (ref 0–0.9)
MONOCYTES NFR BLD AUTO: 9.3 % — SIGNIFICANT CHANGE UP (ref 2–14)
NEUTROPHILS # BLD AUTO: 3.14 K/UL — SIGNIFICANT CHANGE UP (ref 1.8–7.4)
NEUTROPHILS NFR BLD AUTO: 57 % — SIGNIFICANT CHANGE UP (ref 43–77)
PLATELET # BLD AUTO: 276 K/UL — SIGNIFICANT CHANGE UP (ref 150–400)
POTASSIUM SERPL-MCNC: 4.3 MMOL/L — SIGNIFICANT CHANGE UP (ref 3.5–5.3)
POTASSIUM SERPL-SCNC: 4.3 MMOL/L — SIGNIFICANT CHANGE UP (ref 3.5–5.3)
PROT SERPL-MCNC: 7.8 GM/DL — SIGNIFICANT CHANGE UP (ref 6–8.3)
RBC # BLD: 4.53 M/UL — SIGNIFICANT CHANGE UP (ref 3.8–5.2)
RBC # FLD: 11.9 % — SIGNIFICANT CHANGE UP (ref 10.3–14.5)
SODIUM SERPL-SCNC: 137 MMOL/L — SIGNIFICANT CHANGE UP (ref 135–145)
WBC # BLD: 5.5 K/UL — SIGNIFICANT CHANGE UP (ref 3.8–10.5)
WBC # FLD AUTO: 5.5 K/UL — SIGNIFICANT CHANGE UP (ref 3.8–10.5)

## 2022-03-02 PROCEDURE — 36415 COLL VENOUS BLD VENIPUNCTURE: CPT

## 2022-03-02 PROCEDURE — 85025 COMPLETE CBC W/AUTO DIFF WBC: CPT

## 2022-03-02 PROCEDURE — 76705 ECHO EXAM OF ABDOMEN: CPT

## 2022-03-02 PROCEDURE — 99284 EMERGENCY DEPT VISIT MOD MDM: CPT | Mod: 25

## 2022-03-02 PROCEDURE — 83690 ASSAY OF LIPASE: CPT

## 2022-03-02 PROCEDURE — 96374 THER/PROPH/DIAG INJ IV PUSH: CPT

## 2022-03-02 PROCEDURE — 80053 COMPREHEN METABOLIC PANEL: CPT

## 2022-03-02 PROCEDURE — 76705 ECHO EXAM OF ABDOMEN: CPT | Mod: 26

## 2022-03-02 PROCEDURE — 99285 EMERGENCY DEPT VISIT HI MDM: CPT

## 2022-03-02 RX ORDER — SUCRALFATE 1 G
1 TABLET ORAL ONCE
Refills: 0 | Status: DISCONTINUED | OUTPATIENT
Start: 2022-03-02 | End: 2022-03-02

## 2022-03-02 RX ORDER — SUCRALFATE 1 G
10 TABLET ORAL
Qty: 200 | Refills: 0
Start: 2022-03-02

## 2022-03-02 RX ORDER — SODIUM CHLORIDE 9 MG/ML
1000 INJECTION INTRAMUSCULAR; INTRAVENOUS; SUBCUTANEOUS ONCE
Refills: 0 | Status: COMPLETED | OUTPATIENT
Start: 2022-03-02 | End: 2022-03-02

## 2022-03-02 RX ORDER — FAMOTIDINE 10 MG/ML
20 INJECTION INTRAVENOUS ONCE
Refills: 0 | Status: COMPLETED | OUTPATIENT
Start: 2022-03-02 | End: 2022-03-02

## 2022-03-02 RX ORDER — SUCRALFATE 1 G
1 TABLET ORAL
Qty: 120 | Refills: 0
Start: 2022-03-02 | End: 2022-03-31

## 2022-03-02 RX ADMIN — FAMOTIDINE 20 MILLIGRAM(S): 10 INJECTION INTRAVENOUS at 10:45

## 2022-03-02 RX ADMIN — SODIUM CHLORIDE 2000 MILLILITER(S): 9 INJECTION INTRAMUSCULAR; INTRAVENOUS; SUBCUTANEOUS at 10:30

## 2022-03-02 RX ADMIN — Medication 30 MILLILITER(S): at 10:45

## 2022-03-02 NOTE — ED STATDOCS - PROGRESS NOTE DETAILS
signed Alycia Mendiola PA-C Pt seen initially in intake by Dr. Magana.   37F with epigastric pain. No significant findings on labwork or imaging. No improvement from meds in ED. Will try carafate. likely gastritis signed Alycia Mendiola PA-C   Pt denies chance of pregnancy at this time. signed Alycia Mendiola PA-C Pt seen initially in intake by Dr. Magana.   37F with epigastric pain. No significant findings on labwork or imaging. No improvement from meds in ED. Will try carafate. likely gastritis. Pt to f/u with GI, Deep will help make appt in ED. Pt feeling well at DC, agrees with DC and plan of care.

## 2022-03-02 NOTE — ED STATDOCS - CARE PROVIDER_API CALL
Leonel Diego)  Gastroenterology; Internal Medicine  [address] [place] [address]  Phone: [phone]  Fax: [phone]  Follow Up Time:

## 2022-03-02 NOTE — ED STATDOCS - NSFOLLOWUPINSTRUCTIONS_ED_ALL_ED_FT
FOLLOW UP WITH DR ROMERO TOMORROW AT YOUR APPOINTMENT. RETURN TO ER FOR ANY WORSENING SYMPTOMS OR NEW CONCERNS.     Gastritis    WHAT YOU NEED TO KNOW:    Gastritis is inflammation or irritation of the lining of your stomach. Abdominal Organs         DISCHARGE INSTRUCTIONS:    Call 911 for any of the following:     You develop chest pain or shortness of breath.        Return to the emergency department if:     You vomit blood.      You have black or bloody bowel movements.      You have severe stomach or back pain.    Contact your healthcare provider if:     You have a fever.      You have new or worsening symptoms, even after treatment.      You have questions or concerns about your condition or care.    Medicines:     Medicines may be given to help treat a bacterial infection or decrease stomach acid.       Take your medicine as directed. Contact your healthcare provider if you think your medicine is not helping or if you have side effects. Tell him or her if you are allergic to any medicine. Keep a list of the medicines, vitamins, and herbs you take. Include the amounts, and when and why you take them. Bring the list or the pill bottles to follow-up visits. Carry your medicine list with you in case of an emergency.    Manage or prevent gastritis:     Do not smoke. Nicotine and other chemicals in cigarettes and cigars can make your symptoms worse and cause lung damage. Ask your healthcare provider for information if you currently smoke and need help to quit. E-cigarettes or smokeless tobacco still contain nicotine. Talk to your healthcare provider before you use these products.       Do not drink alcohol. Alcohol can prevent healing and make your gastritis worse. Talk to your healthcare provider if you need help to stop drinking.      Do not take NSAIDs or aspirin unless directed. These and similar medicines can cause irritation. If your healthcare provider says it is okay to take NSAIDs, take them with food.       Do not eat foods that cause irritation. Foods such as oranges and salsa can cause burning or pain. Eat a variety of healthy foods. Examples include fruits (not citrus), vegetables, low-fat dairy products, beans, whole-grain breads, and lean meats and fish. Try to eat small meals, and drink water with your meals. Do not eat for at least 3 hours before you go to bed.      Find ways to relax and decrease stress. Stress can increase stomach acid and make gastritis worse. Activities such as yoga, meditation, or listening to music can help you relax. Spend time with friends, or do things you enjoy.    Follow up with your healthcare provider as directed: You may need ongoing tests or treatment, or referral to a gastroenterologist. Write down your questions so you remember to ask them during your visits.

## 2022-03-02 NOTE — ED STATDOCS - GASTROINTESTINAL, MLM
abdomen soft, and non-distended. Bowel sounds present. Epigastric and RUQ TTP. No rebound or guarding.

## 2022-03-02 NOTE — ED STATDOCS - PATIENT PORTAL LINK FT
You can access the FollowMyHealth Patient Portal offered by [facility] [place] by registering at the following website: [email or web address]. By joining [employer]’s FollowMyHealth portal, you will also be able to view your health information using other applications (apps) compatible with our system.

## 2022-03-02 NOTE — ED STATDOCS - OBJECTIVE STATEMENT
36 y/o female presents to the ED c/o dull epigastric pain x1.5 weeks, worsening over the last 2 days. Pain exacerbated with PO intake. Pt took Pepto Bismol and Pepcid at home without relief. Denies n/v/d. LNMP: 2/20/22. Nonsmoker. No other complaints at this time.

## 2022-03-03 ENCOUNTER — APPOINTMENT (OUTPATIENT)
Dept: INTERNAL MEDICINE | Facility: CLINIC | Age: 37
End: 2022-03-03

## 2022-03-03 ENCOUNTER — APPOINTMENT (OUTPATIENT)
Dept: GASTROENTEROLOGY | Facility: CLINIC | Age: 37
End: 2022-03-03
Payer: COMMERCIAL

## 2022-03-03 VITALS — HEIGHT: 68 IN

## 2022-03-03 PROCEDURE — 99204 OFFICE O/P NEW MOD 45 MIN: CPT

## 2022-03-03 NOTE — ASSESSMENT
[FreeTextEntry1] : 36yo female with epigastric pain\par \par concerned for PUD\par \par pantoprazole, liquid sucralfate\par will check egd\par Risks and benefits of procedure(s) discussed with patient in detail, including but not limited to, perforation, bleeding, reaction to anesthesia, missed lesions.\par \par check cck hida if above negative

## 2022-03-03 NOTE — HISTORY OF PRESENT ILLNESS
[de-identified] : 38yo female for evaluation of epigastric pain\par \par She developed epigastric pain few days ago. It got severe and she went to er with negative labs and sono, OTC meds without help\par \par Tolerating some fruit but otherwise PO makes worse

## 2022-03-08 ENCOUNTER — APPOINTMENT (OUTPATIENT)
Dept: GASTROENTEROLOGY | Facility: AMBULATORY MEDICAL SERVICES | Age: 37
End: 2022-03-08
Payer: COMMERCIAL

## 2022-03-08 PROCEDURE — 43239 EGD BIOPSY SINGLE/MULTIPLE: CPT

## 2022-04-04 ENCOUNTER — RESULT REVIEW (OUTPATIENT)
Age: 37
End: 2022-04-04

## 2022-04-04 ENCOUNTER — OUTPATIENT (OUTPATIENT)
Dept: OUTPATIENT SERVICES | Facility: HOSPITAL | Age: 37
LOS: 1 days | End: 2022-04-04
Payer: COMMERCIAL

## 2022-04-04 DIAGNOSIS — R10.13 EPIGASTRIC PAIN: ICD-10-CM

## 2022-04-04 PROCEDURE — A9537: CPT

## 2022-04-04 PROCEDURE — 78227 HEPATOBIL SYST IMAGE W/DRUG: CPT

## 2022-04-04 PROCEDURE — 78227 HEPATOBIL SYST IMAGE W/DRUG: CPT | Mod: 26

## 2022-04-04 RX ORDER — SINCALIDE 5 UG/5ML
1.7 INJECTION, POWDER, LYOPHILIZED, FOR SOLUTION INTRAVENOUS ONCE
Refills: 0 | Status: COMPLETED | OUTPATIENT
Start: 2022-04-04 | End: 2022-04-04

## 2022-04-04 RX ADMIN — SINCALIDE 51.7 MICROGRAM(S): 5 INJECTION, POWDER, LYOPHILIZED, FOR SOLUTION INTRAVENOUS at 11:05

## 2022-04-05 DIAGNOSIS — R10.13 EPIGASTRIC PAIN: ICD-10-CM

## 2022-04-15 ENCOUNTER — APPOINTMENT (OUTPATIENT)
Dept: SURGERY | Facility: CLINIC | Age: 37
End: 2022-04-15
Payer: COMMERCIAL

## 2022-04-15 VITALS
SYSTOLIC BLOOD PRESSURE: 118 MMHG | DIASTOLIC BLOOD PRESSURE: 72 MMHG | TEMPERATURE: 97.6 F | OXYGEN SATURATION: 96 % | HEIGHT: 68 IN | HEART RATE: 76 BPM | WEIGHT: 185 LBS | BODY MASS INDEX: 28.04 KG/M2

## 2022-04-15 PROCEDURE — 99204 OFFICE O/P NEW MOD 45 MIN: CPT

## 2022-04-15 RX ORDER — CLOBETASOL PROPIONATE 0.5 MG/G
0.05 OINTMENT TOPICAL
Qty: 1 | Refills: 1 | Status: COMPLETED | COMMUNITY
Start: 2020-10-05 | End: 2022-04-15

## 2022-04-15 RX ORDER — METHYLPREDNISOLONE 4 MG/1
4 TABLET ORAL
Qty: 1 | Refills: 0 | Status: COMPLETED | COMMUNITY
Start: 2021-02-25 | End: 2022-04-15

## 2022-04-15 RX ORDER — METHYLPREDNISOLONE 4 MG/1
4 TABLET ORAL
Qty: 1 | Refills: 0 | Status: COMPLETED | COMMUNITY
Start: 2022-01-04 | End: 2022-04-15

## 2022-04-15 NOTE — PHYSICAL EXAM
[JVD] : no jugular venous distention  [Normal Breath Sounds] : Normal breath sounds [Normal Heart Sounds] : normal heart sounds [Abdomen Tenderness] : ~T ~M Abdominal tenderness [No HSM] : no hepatosplenomegaly [No Rash or Lesion] : No rash or lesion [Alert] : alert [Oriented to Person] : oriented to person [Oriented to Place] : oriented to place [Oriented to Time] : oriented to time [Calm] : calm [de-identified] : NAD [de-identified] : soft, mild RUQ tenderness [de-identified] : NC/AT PER [de-identified] : no CVA tenderness [de-identified] : moves all 4 extr

## 2022-04-15 NOTE — REVIEW OF SYSTEMS
[Fever] : no fever [Chills] : no chills [Eye Pain] : no eye pain [Nosebleeds] : no nosebleeds [Chest Pain] : no chest pain [Abdominal Pain] : abdominal pain [Convulsions] : no convulsions [Easy Bleeding] : no tendency for easy bleeding [Easy Bruising] : no tendency for easy bruising

## 2022-04-15 NOTE — CONSULT LETTER
[Dear  ___] : Dear  [unfilled], [Consult Letter:] : I had the pleasure of evaluating your patient, [unfilled]. [Please see my note below.] : Please see my note below. [Consult Closing:] : Thank you very much for allowing me to participate in the care of this patient.  If you have any questions, please do not hesitate to contact me. [Sincerely,] : Sincerely, [FreeTextEntry3] : Wm Oscar MILLAN [DrMonalisa  ___] : Dr. NORTON

## 2022-04-18 RX ORDER — FENTANYL CITRATE 50 UG/ML
50 INJECTION INTRAVENOUS
Refills: 0 | Status: DISCONTINUED | OUTPATIENT
Start: 2022-04-19 | End: 2022-04-19

## 2022-04-18 RX ORDER — ONDANSETRON 8 MG/1
4 TABLET, FILM COATED ORAL ONCE
Refills: 0 | Status: DISCONTINUED | OUTPATIENT
Start: 2022-04-19 | End: 2022-04-19

## 2022-04-18 RX ORDER — SODIUM CHLORIDE 9 MG/ML
1000 INJECTION, SOLUTION INTRAVENOUS
Refills: 0 | Status: DISCONTINUED | OUTPATIENT
Start: 2022-04-19 | End: 2022-04-19

## 2022-04-18 NOTE — ASU PATIENT PROFILE, ADULT - NSICDXPASTSURGICALHX_GEN_ALL_CORE_FT
PAST SURGICAL HISTORY:  No significant past surgical history PAST SURGICAL HISTORY:  History of surgery bilateral tissue removed from axillas    History of surgery egg retrieval    [place] teeth removed

## 2022-04-18 NOTE — ASU PATIENT PROFILE, ADULT - NSICDXPASTMEDICALHX_GEN_ALL_CORE_FT
PAST MEDICAL HISTORY:  Biliary dyskinesia      PAST MEDICAL HISTORY:  Biliary dyskinesia     History of alcohol use sober 7 years     PAST MEDICAL HISTORY:  Anxiety     Biliary dyskinesia     History of alcohol use sober 7 years

## 2022-04-19 ENCOUNTER — OUTPATIENT (OUTPATIENT)
Dept: INPATIENT UNIT | Facility: HOSPITAL | Age: 37
LOS: 1 days | Discharge: HOME | End: 2022-04-19
Payer: COMMERCIAL

## 2022-04-19 ENCOUNTER — APPOINTMENT (OUTPATIENT)
Dept: SURGERY | Facility: HOSPITAL | Age: 37
End: 2022-04-19

## 2022-04-19 ENCOUNTER — RESULT REVIEW (OUTPATIENT)
Age: 37
End: 2022-04-19

## 2022-04-19 ENCOUNTER — TRANSCRIPTION ENCOUNTER (OUTPATIENT)
Age: 37
End: 2022-04-19

## 2022-04-19 VITALS
OXYGEN SATURATION: 100 % | RESPIRATION RATE: 15 BRPM | TEMPERATURE: 98 F | DIASTOLIC BLOOD PRESSURE: 68 MMHG | SYSTOLIC BLOOD PRESSURE: 106 MMHG | HEART RATE: 76 BPM

## 2022-04-19 VITALS
HEART RATE: 63 BPM | TEMPERATURE: 98 F | DIASTOLIC BLOOD PRESSURE: 65 MMHG | SYSTOLIC BLOOD PRESSURE: 112 MMHG | WEIGHT: 184.97 LBS | OXYGEN SATURATION: 98 % | RESPIRATION RATE: 16 BRPM | HEIGHT: 68 IN

## 2022-04-19 DIAGNOSIS — Z98.890 OTHER SPECIFIED POSTPROCEDURAL STATES: Chronic | ICD-10-CM

## 2022-04-19 DIAGNOSIS — K80.20 CALCULUS OF GALLBLADDER WITHOUT CHOLECYSTITIS WITHOUT OBSTRUCTION: ICD-10-CM

## 2022-04-19 DIAGNOSIS — K08.409 PARTIAL LOSS OF TEETH, UNSPECIFIED CAUSE, UNSPECIFIED CLASS: Chronic | ICD-10-CM

## 2022-04-19 LAB — HCG UR QL: NEGATIVE — SIGNIFICANT CHANGE UP

## 2022-04-19 PROCEDURE — 12345: CPT | Mod: NC

## 2022-04-19 PROCEDURE — 47562 LAPAROSCOPIC CHOLECYSTECTOMY: CPT

## 2022-04-19 PROCEDURE — 36415 COLL VENOUS BLD VENIPUNCTURE: CPT

## 2022-04-19 PROCEDURE — 86850 RBC ANTIBODY SCREEN: CPT

## 2022-04-19 PROCEDURE — 86900 BLOOD TYPING SEROLOGIC ABO: CPT

## 2022-04-19 PROCEDURE — 81025 URINE PREGNANCY TEST: CPT

## 2022-04-19 PROCEDURE — 88304 TISSUE EXAM BY PATHOLOGIST: CPT | Mod: 26

## 2022-04-19 PROCEDURE — 88304 TISSUE EXAM BY PATHOLOGIST: CPT

## 2022-04-19 PROCEDURE — C9399: CPT

## 2022-04-19 PROCEDURE — 86901 BLOOD TYPING SEROLOGIC RH(D): CPT

## 2022-04-19 RX ADMIN — FENTANYL CITRATE 50 MICROGRAM(S): 50 INJECTION INTRAVENOUS at 10:21

## 2022-04-19 RX ADMIN — FENTANYL CITRATE 50 MICROGRAM(S): 50 INJECTION INTRAVENOUS at 09:58

## 2022-04-19 RX ADMIN — FENTANYL CITRATE 50 MICROGRAM(S): 50 INJECTION INTRAVENOUS at 10:44

## 2022-04-19 RX ADMIN — FENTANYL CITRATE 50 MICROGRAM(S): 50 INJECTION INTRAVENOUS at 10:12

## 2022-04-19 NOTE — ASU DISCHARGE PLAN (ADULT/PEDIATRIC) - NS MD DC FALL RISK RISK
For information on Fall & Injury Prevention, visit: [email or web address] OR  [email or web address] OR  https://www.cdc.gov/steadi/patient.html

## 2022-04-19 NOTE — ASU DISCHARGE PLAN (ADULT/PEDIATRIC) - CARE PROVIDER_API CALL
Ace Moore)  Surgery; Vascular Surgery  [facility], [address] [place] [address]  Phone: [phone]  Fax: [phone]  Follow Up Time: 1 week

## 2022-04-20 PROBLEM — Z87.898 PERSONAL HISTORY OF OTHER SPECIFIED CONDITIONS: Chronic | Status: ACTIVE | Noted: 2022-04-19

## 2022-04-20 PROBLEM — F41.9 ANXIETY DISORDER, UNSPECIFIED: Chronic | Status: ACTIVE | Noted: 2022-04-19

## 2022-04-20 PROBLEM — K82.8 OTHER SPECIFIED DISEASES OF GALLBLADDER: Chronic | Status: ACTIVE | Noted: 2022-04-18

## 2022-04-26 ENCOUNTER — APPOINTMENT (OUTPATIENT)
Dept: SURGERY | Facility: CLINIC | Age: 37
End: 2022-04-26
Payer: COMMERCIAL

## 2022-04-26 DIAGNOSIS — F41.9 ANXIETY DISORDER, UNSPECIFIED: ICD-10-CM

## 2022-04-26 DIAGNOSIS — K82.8 OTHER SPECIFIED DISEASES OF GALLBLADDER: ICD-10-CM

## 2022-04-26 DIAGNOSIS — K21.9 GASTRO-ESOPHAGEAL REFLUX DISEASE WITHOUT ESOPHAGITIS: ICD-10-CM

## 2022-04-26 DIAGNOSIS — K80.10 CALCULUS OF GALLBLADDER WITH CHRONIC CHOLECYSTITIS WITHOUT OBSTRUCTION: ICD-10-CM

## 2022-04-26 PROCEDURE — 99024 POSTOP FOLLOW-UP VISIT: CPT

## 2022-04-26 NOTE — PHYSICAL EXAM
[Normal Breath Sounds] : Normal breath sounds [Normal Heart Sounds] : normal heart sounds [Alert] : alert [Oriented to Person] : oriented to person [Oriented to Place] : oriented to place [Oriented to Time] : oriented to time [Calm] : calm [de-identified] : soft, wounds healing well without infection

## 2022-10-21 ENCOUNTER — APPOINTMENT (OUTPATIENT)
Dept: DERMATOLOGY | Facility: CLINIC | Age: 37
End: 2022-10-21

## 2022-11-29 ENCOUNTER — APPOINTMENT (OUTPATIENT)
Dept: ULTRASOUND IMAGING | Facility: CLINIC | Age: 37
End: 2022-11-29

## 2022-11-29 PROCEDURE — 76801 OB US < 14 WKS SINGLE FETUS: CPT

## 2022-11-29 PROCEDURE — 76817 TRANSVAGINAL US OBSTETRIC: CPT

## 2022-12-02 ENCOUNTER — INPATIENT (INPATIENT)
Facility: HOSPITAL | Age: 37
LOS: 0 days | Discharge: HOME | DRG: 819 | End: 2022-12-03
Attending: OBSTETRICS & GYNECOLOGY | Admitting: OBSTETRICS & GYNECOLOGY
Payer: COMMERCIAL

## 2022-12-02 ENCOUNTER — TRANSCRIPTION ENCOUNTER (OUTPATIENT)
Age: 37
End: 2022-12-02

## 2022-12-02 ENCOUNTER — RESULT REVIEW (OUTPATIENT)
Age: 37
End: 2022-12-02

## 2022-12-02 VITALS — HEIGHT: 68 IN | WEIGHT: 173.06 LBS

## 2022-12-02 DIAGNOSIS — K08.409 PARTIAL LOSS OF TEETH, UNSPECIFIED CAUSE, UNSPECIFIED CLASS: Chronic | ICD-10-CM

## 2022-12-02 DIAGNOSIS — Z98.890 OTHER SPECIFIED POSTPROCEDURAL STATES: Chronic | ICD-10-CM

## 2022-12-02 DIAGNOSIS — O00.90 UNSPECIFIED ECTOPIC PREGNANCY WITHOUT INTRAUTERINE PREGNANCY: ICD-10-CM

## 2022-12-02 LAB
ALBUMIN SERPL ELPH-MCNC: 3.6 G/DL — SIGNIFICANT CHANGE UP (ref 3.3–5)
ALP SERPL-CCNC: 56 U/L — SIGNIFICANT CHANGE UP (ref 40–120)
ALT FLD-CCNC: 18 U/L — SIGNIFICANT CHANGE UP (ref 12–78)
ANION GAP SERPL CALC-SCNC: 5 MMOL/L — SIGNIFICANT CHANGE UP (ref 5–17)
AST SERPL-CCNC: 13 U/L — LOW (ref 15–37)
BASOPHILS # BLD AUTO: 0.06 K/UL — SIGNIFICANT CHANGE UP (ref 0–0.2)
BASOPHILS NFR BLD AUTO: 0.6 % — SIGNIFICANT CHANGE UP (ref 0–2)
BILIRUB SERPL-MCNC: 0.2 MG/DL — SIGNIFICANT CHANGE UP (ref 0.2–1.2)
BUN SERPL-MCNC: 17 MG/DL — SIGNIFICANT CHANGE UP (ref 7–23)
CALCIUM SERPL-MCNC: 9.1 MG/DL — SIGNIFICANT CHANGE UP (ref 8.5–10.1)
CHLORIDE SERPL-SCNC: 107 MMOL/L — SIGNIFICANT CHANGE UP (ref 96–108)
CO2 SERPL-SCNC: 26 MMOL/L — SIGNIFICANT CHANGE UP (ref 22–31)
CREAT SERPL-MCNC: 0.96 MG/DL — SIGNIFICANT CHANGE UP (ref 0.5–1.3)
EGFR: 79 ML/MIN/1.73M2 — SIGNIFICANT CHANGE UP
EOSINOPHIL # BLD AUTO: 0.14 K/UL — SIGNIFICANT CHANGE UP (ref 0–0.5)
EOSINOPHIL NFR BLD AUTO: 1.4 % — SIGNIFICANT CHANGE UP (ref 0–6)
FLUAV AG NPH QL: SIGNIFICANT CHANGE UP
FLUBV AG NPH QL: SIGNIFICANT CHANGE UP
GLUCOSE SERPL-MCNC: 111 MG/DL — HIGH (ref 70–99)
HCG SERPL-ACNC: 456 MIU/ML — HIGH
HCT VFR BLD CALC: 38.5 % — SIGNIFICANT CHANGE UP (ref 34.5–45)
HGB BLD-MCNC: 13.1 G/DL — SIGNIFICANT CHANGE UP (ref 11.5–15.5)
IMM GRANULOCYTES NFR BLD AUTO: 0.4 % — SIGNIFICANT CHANGE UP (ref 0–0.9)
LYMPHOCYTES # BLD AUTO: 2.19 K/UL — SIGNIFICANT CHANGE UP (ref 1–3.3)
LYMPHOCYTES # BLD AUTO: 21.5 % — SIGNIFICANT CHANGE UP (ref 13–44)
MCHC RBC-ENTMCNC: 30.1 PG — SIGNIFICANT CHANGE UP (ref 27–34)
MCHC RBC-ENTMCNC: 34 GM/DL — SIGNIFICANT CHANGE UP (ref 32–36)
MCV RBC AUTO: 88.5 FL — SIGNIFICANT CHANGE UP (ref 80–100)
MONOCYTES # BLD AUTO: 0.79 K/UL — SIGNIFICANT CHANGE UP (ref 0–0.9)
MONOCYTES NFR BLD AUTO: 7.8 % — SIGNIFICANT CHANGE UP (ref 2–14)
NEUTROPHILS # BLD AUTO: 6.97 K/UL — SIGNIFICANT CHANGE UP (ref 1.8–7.4)
NEUTROPHILS NFR BLD AUTO: 68.3 % — SIGNIFICANT CHANGE UP (ref 43–77)
PLATELET # BLD AUTO: 293 K/UL — SIGNIFICANT CHANGE UP (ref 150–400)
POTASSIUM SERPL-MCNC: 3.8 MMOL/L — SIGNIFICANT CHANGE UP (ref 3.5–5.3)
POTASSIUM SERPL-SCNC: 3.8 MMOL/L — SIGNIFICANT CHANGE UP (ref 3.5–5.3)
PROT SERPL-MCNC: 7.5 GM/DL — SIGNIFICANT CHANGE UP (ref 6–8.3)
RBC # BLD: 4.35 M/UL — SIGNIFICANT CHANGE UP (ref 3.8–5.2)
RBC # FLD: 12.3 % — SIGNIFICANT CHANGE UP (ref 10.3–14.5)
RSV RNA NPH QL NAA+NON-PROBE: SIGNIFICANT CHANGE UP
SARS-COV-2 RNA SPEC QL NAA+PROBE: SIGNIFICANT CHANGE UP
SODIUM SERPL-SCNC: 138 MMOL/L — SIGNIFICANT CHANGE UP (ref 135–145)
WBC # BLD: 10.19 K/UL — SIGNIFICANT CHANGE UP (ref 3.8–10.5)
WBC # FLD AUTO: 10.19 K/UL — SIGNIFICANT CHANGE UP (ref 3.8–10.5)

## 2022-12-02 PROCEDURE — 93010 ELECTROCARDIOGRAM REPORT: CPT

## 2022-12-02 PROCEDURE — 88305 TISSUE EXAM BY PATHOLOGIST: CPT | Mod: 26

## 2022-12-02 PROCEDURE — 99285 EMERGENCY DEPT VISIT HI MDM: CPT

## 2022-12-02 PROCEDURE — 88305 TISSUE EXAM BY PATHOLOGIST: CPT

## 2022-12-02 PROCEDURE — 90384 RH IG FULL-DOSE IM: CPT

## 2022-12-02 PROCEDURE — 71045 X-RAY EXAM CHEST 1 VIEW: CPT | Mod: 26

## 2022-12-02 PROCEDURE — 76817 TRANSVAGINAL US OBSTETRIC: CPT | Mod: 26

## 2022-12-02 PROCEDURE — C9399: CPT

## 2022-12-02 PROCEDURE — 99232 SBSQ HOSP IP/OBS MODERATE 35: CPT | Mod: GC

## 2022-12-02 RX ORDER — MORPHINE SULFATE 50 MG/1
2 CAPSULE, EXTENDED RELEASE ORAL ONCE
Refills: 0 | Status: DISCONTINUED | OUTPATIENT
Start: 2022-12-02 | End: 2022-12-02

## 2022-12-02 RX ORDER — FENTANYL CITRATE 50 UG/ML
50 INJECTION INTRAVENOUS
Refills: 0 | Status: DISCONTINUED | OUTPATIENT
Start: 2022-12-02 | End: 2022-12-03

## 2022-12-02 RX ORDER — SODIUM CHLORIDE 9 MG/ML
2 INJECTION INTRAMUSCULAR; INTRAVENOUS; SUBCUTANEOUS ONCE
Refills: 0 | Status: DISCONTINUED | OUTPATIENT
Start: 2022-12-02 | End: 2022-12-02

## 2022-12-02 RX ORDER — SODIUM CHLORIDE 9 MG/ML
1000 INJECTION, SOLUTION INTRAVENOUS
Refills: 0 | Status: DISCONTINUED | OUTPATIENT
Start: 2022-12-02 | End: 2022-12-03

## 2022-12-02 RX ORDER — OXYCODONE HYDROCHLORIDE 5 MG/1
5 TABLET ORAL ONCE
Refills: 0 | Status: DISCONTINUED | OUTPATIENT
Start: 2022-12-02 | End: 2022-12-02

## 2022-12-02 RX ORDER — FOLLITROPIN 300 [IU]/.36ML
300 INJECTION, SOLUTION SUBCUTANEOUS
Qty: 0 | Refills: 0 | DISCHARGE

## 2022-12-02 RX ORDER — SODIUM CHLORIDE 9 MG/ML
2000 INJECTION INTRAMUSCULAR; INTRAVENOUS; SUBCUTANEOUS ONCE
Refills: 0 | Status: COMPLETED | OUTPATIENT
Start: 2022-12-02 | End: 2022-12-02

## 2022-12-02 RX ORDER — SERTRALINE 25 MG/1
150 TABLET, FILM COATED ORAL
Qty: 0 | Refills: 0 | DISCHARGE

## 2022-12-02 RX ORDER — MENOTROPINS 75 UNIT
150 AMPUL (EA) INTRAMUSCULAR
Qty: 0 | Refills: 0 | DISCHARGE

## 2022-12-02 RX ORDER — ONDANSETRON 8 MG/1
4 TABLET, FILM COATED ORAL ONCE
Refills: 0 | Status: DISCONTINUED | OUTPATIENT
Start: 2022-12-02 | End: 2022-12-03

## 2022-12-02 RX ORDER — ACETAMINOPHEN 500 MG
1000 TABLET ORAL ONCE
Refills: 0 | Status: COMPLETED | OUTPATIENT
Start: 2022-12-02 | End: 2022-12-02

## 2022-12-02 RX ORDER — MORPHINE SULFATE 50 MG/1
4 CAPSULE, EXTENDED RELEASE ORAL ONCE
Refills: 0 | Status: DISCONTINUED | OUTPATIENT
Start: 2022-12-02 | End: 2022-12-02

## 2022-12-02 RX ADMIN — OXYCODONE HYDROCHLORIDE 5 MILLIGRAM(S): 5 TABLET ORAL at 23:31

## 2022-12-02 RX ADMIN — MORPHINE SULFATE 2 MILLIGRAM(S): 50 CAPSULE, EXTENDED RELEASE ORAL at 18:02

## 2022-12-02 RX ADMIN — Medication 1000 MILLIGRAM(S): at 18:32

## 2022-12-02 RX ADMIN — SODIUM CHLORIDE 2000 MILLILITER(S): 9 INJECTION INTRAMUSCULAR; INTRAVENOUS; SUBCUTANEOUS at 17:14

## 2022-12-02 RX ADMIN — MORPHINE SULFATE 4 MILLIGRAM(S): 50 CAPSULE, EXTENDED RELEASE ORAL at 20:16

## 2022-12-02 NOTE — H&P ADULT - HISTORY OF PRESENT ILLNESS
DIYA IZAGUIRRE is a 38 yo  at 6w6d by LMP 10/15/2022 presents to ED with worsening left lower abdominal pain x1 day with concern for ectopic pregnancy. Pt reports accompanying vaginal bleeding.  She states she had a sono today with her fertility specialist who saw a small structure in the left adnexa. This pregnancy is a result of IVF, pt with bhcg of 280 on 22, with continued drop in bhcg levels, no visual IUP and nothing seen in the adnexa. She denies any fevers, chills, dizziness, palpitations, sob, n/v, or unusual discharge.     ROS:  Gen: no fatigue  CV: no chest pain  Resp: no SOB, wheezing  Neuro: no vision change, headache  GI: no abdominal pain, diarrhea, constipation  : no dysuria, increased frequency, urge  Gyn: no abnormal discharge  ID: no fevers, chills  Int: no rash  MSK: no weakness    PAST MEDICAL & SURGICAL HISTORY:  Biliary dyskinesia  History of alcohol use-sober 7 years  Anxiety      History of surgery  bilateral tissue removed from axillas  [place] teeth removed  egg retrieval  Cholecystectomy         ObHx:  SABx2, TOPx1  GynHx:     no h/o abnormal Paps  denies h/o fibroids, cysts, STIs    SocHx: Denies current toxic habits x3  Allergies: No Known Allergies      Home Medications:  busparone: 2   once a day (at bedtime) (2022 06:47)  Zoloft: 150 milligram(s) orally once a day (2022 06:47)        Height (cm): 172.7 (22 @ 16:00)  Weight (kg): 78.5 (22 @ 16:00)  BMI (kg/m2): 26.3 (22 @ 16:00)  BSA (m2): 1.92 (22 @ 16:00)  T(C): 36.6 (22 @ 16:36), Max: 36.6 (22 @ 16:36)  HR: 58 (22 @ 16:36) (58 - 58)  BP: 94/53 (22 @ 16:36) (94/53 - 94/53)  RR: 18 (22 @ 16:36) (18 - 18)  SpO2: 97% (22 @ 16:36) (97% - 97%)    Physical Exam:  Gen: alert oriented; appears uncomfortable  Pulm: Normal respiratory effort  Abd: soft, mild tenderness to palpation LLQ, no guarding or rebound tenderness                                  13.1   10.19 )-----------( 293      ( 02 Dec 2022 16:55 )             38.5

## 2022-12-02 NOTE — ED PROVIDER NOTE - NSICDXPASTSURGICALHX_GEN_ALL_CORE_FT
PAST SURGICAL HISTORY:  History of surgery bilateral tissue removed from axillas    History of surgery egg retrieval    [place] teeth removed

## 2022-12-02 NOTE — ASU DISCHARGE PLAN (ADULT/PEDIATRIC) - CARE PROVIDER_API CALL
[facility]  [address] [place] [address]  Phone: [phone]  Fax: [phone]  Follow Up Time:

## 2022-12-02 NOTE — H&P ADULT - ATTENDING COMMENTS
Agree with above  Pt with left ectopic pregnancy.  Pt in significant pain requiring morphine.    Pt for Diagnostic laparoscopy, possible left salpingectomy.    Will:    1) Admit to Gyn  2) OR Aware/Anesthesia aware  3) Consent    Pt understands risks/benefits of surgery including but not limited to bleeding, infection, injury to bowel/bladder, nerve damage, blood tx, and even death.  Pt has verbalized understanding of the above and has signed informed consent.    EDWAR Kern

## 2022-12-02 NOTE — ED STATDOCS - NSICDXPASTSURGICALHX_GEN_ALL_CORE_FT
PAST SURGICAL HISTORY:  History of surgery bilateral tissue removed from axillas    History of surgery egg retrieval    [place] teeth removed

## 2022-12-02 NOTE — ED ADULT NURSE NOTE - NSICDXPASTSURGICALHX_GEN_ALL_CORE_FT
PAST SURGICAL HISTORY:  History of surgery bilateral tissue removed from axillas    History of surgery egg retrieval    [place] teeth removed

## 2022-12-02 NOTE — BRIEF OPERATIVE NOTE - NSICDXBRIEFPROCEDURE_GEN_ALL_CORE_FT
PROCEDURES:  Diagnostic laparoscopy 02-Dec-2022 23:33:27  Emilee Hoskins  Laparoscopic left salpingectomy for ectopic pregnancy 02-Dec-2022 23:32:50 single site Emilee Hoskins

## 2022-12-02 NOTE — ED STATDOCS - NS_ ATTENDINGSCRIBEDETAILS _ED_A_ED_FT
I Ty Rodriguez MD saw and examined the patient. Scribe documented for me and under my supervision. I have modified the scribe's documentation where necessary to reflect my history, physical exam and other relevant documentations pertinent to the care of the patient.

## 2022-12-02 NOTE — CONSULT NOTE ADULT - ASSESSMENT
DIYA IZAGUIRRE is a 36 yo  at 6w6d by LMP 10/15/2022 presents to ED with worsening left lower abdominal pain in the setting of positive bHCG. Deferential includes ectopic pregnacy vs pregnancy of unknown location vs SAB  - VSS, labs so far wnl, pt is hemodynamically stable   - chem panel/ bhcg/ type and screen pending  - TVUS- pending  - IV pain control   - Pelvic exam   - Will reevaluate and treat based on sono findings and clinical correlation     DIYA IZAGUIRRE is a 38 yo  at 6w6d by LMP 10/15/2022 presents to ED with worsening left lower abdominal pain in the setting of positive bHCG. Deferential includes ectopic pregnacy vs pregnancy of unknown location vs SAB  - VSS, labs so far wnl, pt is hemodynamically stable   - CBC, CMP wnl  - bhcg 450 however given declining hcg with then rise in bchg concern for left ectopic pregnancy  - TVUS- [id number] left adnexal structure with central complex fluid concerning for left ectopic prengnacy, pt with worsening pain, now requiring morphine for pain control  - Covid neg, Rh neg blood type, will require rhogam given vaginal bleeding  - Pt to be admitted, to OR for surgical management. Consent for exploratory laparoscopy, possible left salpingectomy, possible exploratory laparotomy and all other indicated procedures obtained. Patient should be NPO with IVF @ 125. OR notified last ate at 1pm. Patient to go to OR for surgery. Risks of surgery including injury to bowel, bladder, surrounding organs discussed with patient. All questions answered    d/w Dr. Kern

## 2022-12-02 NOTE — H&P ADULT - ASSESSMENT
DIYA IZAGUIRRE is a 36 yo  at 6w6d by LMP 10/15/2022 presents to ED with worsening left lower abdominal pain in the setting of positive bHCG. Deferential includes ectopic pregnacy vs pregnancy of unknown location vs SAB  - VSS, labs so far wnl, pt is hemodynamically stable   - CBC, CMP wnl  - bhcg 450 however given declining hcg with then rise in bchg concern for left ectopic pregnancy  - TVUS- [id number] left adnexal structure with central complex fluid concerning for left ectopic prengnacy, pt with worsening pain, now requiring morphine for pain control  - Covid neg, Rh neg blood type, will require rhogam given vaginal bleeding  - Pt to be admitted, to OR for surgical management. Consent for exploratory laparoscopy, possible left salpingectomy, possible exploratory laparotomy and all other indicated procedures obtained. Patient should be NPO with IVF @ 125. OR notified last ate at 1pm. Patient to go to OR for surgery. Risks of surgery including injury to bowel, bladder, surrounding organs discussed with patient. All questions answered    d/w Dr. Kern

## 2022-12-02 NOTE — BRIEF OPERATIVE NOTE - NSICDXBRIEFPREOP_GEN_ALL_CORE_FT
PRE-OP DIAGNOSIS:  Ruptured left tubal ectopic pregnancy causing hemoperitoneum 02-Dec-2022 23:33:50  Emilee Hoskins

## 2022-12-02 NOTE — ED PROVIDER NOTE - MUSCULOSKELETAL, MLM
Spine appears normal, range of motion is not limited, no muscle or joint tenderness Spine appears normal, range of motion is not limited, left pelvic tenderness

## 2022-12-02 NOTE — ED STATDOCS - PROGRESS NOTE DETAILS
Dani Jain for attending Dr. Rodriguez: 36 y/o F with a PMHx of ETOH abuse (sober for 7 years), anxiety, A3, and biliary dyskinesia presents to the ED c/o abdominal pain.pt is 6 weeks pregnant sent in by IVF MD, Dr. Ford. patient with confirmed ectopic pregnancy via US today and was to start MTF, but due to progressively worsening abd pain was sent to  ED. OBGYN Dr. Nava. pt noted to have a bp of 94/53, progressively abd pain, given ectopic pregnancy. Given that pt is at risk for acute rupture will send to main for further care and evaluation. Dani Jain for attending Dr. Rodriguez: contacted gynocology spoke [facility] gynocology resident at [phone]. Gynocologist verified presentation. pt ok to get US. Requests call back after results of US. Will inform main doctor. Dani Jain for attending Dr. Rodriguez: contacted gynecology spoke with gynecology resident at [phone]. Gynecologist verified presentation. pt ok to get US. Requests call back after results of US. Will inform main doctor. Dani Jain for attending Dr. Rodriguez: contacted gynecology spoke with gynecology resident at [phone]. Gynecologist verified presentation. pt ok to get US. Requests call back after results of US. Will inform main doctor. Follow up care as per main ED attending. Dani Jain for attending Dr. Rodriguez: 36 y/o F with a PMHx of ETOH abuse (sober for 7 years), anxiety, , and biliary dyskinesia presents to the ED c/o abdominal pain. pt is 6 weeks pregnant sent in by IVF MD, Dr. Ford. patient with confirmed ectopic pregnancy via US today and was to start MTF, but due to progressively worsening abd pain was sent to ED. OBGYN Dr. Nava. pt noted to have a bp of 94/53, progressively abd pain, given ectopic pregnancy. Given that pt is at risk for acute rupture will send to main for further care and evaluation. Will contact gyn.

## 2022-12-02 NOTE — H&P ADULT - NSHPLABSRESULTS_GEN_ALL_CORE
< from: US Transvaginal, OB (12.02.22 @ 17:45) >      ACC: [id number] EXAM:  US OB TRANSVAGINAL                          PROCEDURE DATE:  12/02/2022          INTERPRETATION:  CLINICAL INFORMATION: Abdominal pain. Diagnosis of   ectopic pregnancy at outside facility.    LMP: 10/20/2022    Estimated Gestational Age by LMP: 6 weeks 1 day.    COMPARISON: 11/29/2022.    Endovaginal and transabdominal pelvic sonogram. Color and Spectral   Doppler was performed.    FINDINGS:  Uterus: 9.6 x 4.9 x 4.6 cm. No sonographic evidence for an intrauterine   gestation. Heterogeneous echotexture is noted within the lower uterine   segment region and upper cervical canal region, which may represent blood.    Endometrial thickness approximates 9 mm.    Right ovary: Not visualized.  Left ovary: 2.5 x 2.4 x 2.1 cm. Within normal limits. Blood flow   identified within the left ovarian parenchyma.  Adjacent to the left ovary there is a 1.9 x 1.8 x 1.4 cm structure with   the suggestion for central fluid, which may represent a tubal ring of   ectopic pregnancy.    Fluid: A small volume of free fluid is identified within the left adnexa.    IMPRESSION: Adjacent to the left ovary there is a 1.9 x 1.8 x 1.4 cm   structure with the suggestion for central fluid, which may represent a   tubal ring of ectopic pregnancy. A small volume of free fluid is   identified within the left adnexa. No sonographic evidence for an   intrauterine gestation. Heterogeneous echotexture is noted within the   lower uterine segment region and upper cervical canal region, which may   represent blood.      --- End of Report ---            JOCE GOSS MD; Attending Radiologist  This document has been electronically signed. Dec  2 2022  7:24PM    < end of copied text >

## 2022-12-02 NOTE — ED STATDOCS - WR ORDER STATUS 1
----- Message from Darleen Moctezuma sent at 6/12/2017 12:48 PM CDT -----  Contact: Aiden Rees  Ms Rees is requesting a refill on Toujeo . Please call Ms Rees back if needed at [phone]. Thank you  
Resulted

## 2022-12-02 NOTE — ED PROVIDER NOTE - NSICDXPASTMEDICALHX_GEN_ALL_CORE_FT
[de-identified] : Discussed findings of today's exam and possible causes of patient's pain.  Educated patient on their most probable diagnosis of chronic right hip and thigh/knee pain for the last 3+ months, acutely exacerbated over the last few weeks; likely trochanteric bursitis with peritrochanteric tendinopathy, and IT band syndrome leading to lateral knee pain.  Reviewed possible courses of treatment, and we collaboratively decided best course of treatment at this time will include conservative management.  Patient will be started on a course of physical therapy to restore normal range of motion and strength as tolerated.  Patient is already done a long course of oral NSAIDs, no need for prescription anti-inflammatory at this time.  Patient advised no evidence of  myotendinous tear or rupture, recommend deferral of MRI as patient does not appear to have any surgical indications at this time.  Follow up as needed.  Patient appreciates and agrees with current plan.\par \par This note was generated using dragon medical dictation software.  A reasonable effort has been made for proofreading its contents, but typos may still remain.  If there are any questions or points of clarification needed please notify my office. PAST MEDICAL HISTORY:  Anxiety     Biliary dyskinesia     History of alcohol use sober 7 years

## 2022-12-02 NOTE — ED ADULT NURSE NOTE - CAS TRG GEN SKIN COLOR
Pre-operative instructions, medication directives and pain scales reviewed with patient. All questions the patient had  were answered. Re-assurance about surgical procedure and day of surgery routine given as needed. The patient verbalized understanding of the pre-op instructions.   Normal for race

## 2022-12-02 NOTE — ED PROVIDER NOTE - OBJECTIVE STATEMENT
36 y/o F with a PMHx of ETOH abuse (sober for 7 years), anxiety, A3, and biliary dyskinesia presents to the ED c/o abdominal pain.pt is 6 weeks pregnant sent in by IVF MD, Dr. Ford. patient with confirmed ectopic pregnancy via US today and was to start MTF, but due to progressively worsening abd pain was sent to  ED. OBGYN Dr. Nava. pt noted to have a bp of 94/53, progressively abd pain, given ectopic pregnancy.

## 2022-12-02 NOTE — BRIEF OPERATIVE NOTE - OPERATION/FINDINGS
Grossly normal uterus, normal left ovary. Left fallopian tube with 2x3cm tubal ectopic pregnancy, right rudimentary fallopian tube with absent right ovary.

## 2022-12-02 NOTE — ED PROVIDER NOTE - NS ED ATTENDING STATEMENT MOD
This was a shared visit with the ABE. I reviewed and verified the documentation and independently performed the documented:

## 2022-12-02 NOTE — BRIEF OPERATIVE NOTE - NSICDXBRIEFPOSTOP_GEN_ALL_CORE_FT
POST-OP DIAGNOSIS:  Ruptured left tubal ectopic pregnancy causing hemoperitoneum 02-Dec-2022 23:33:53  Emilee Hoskins

## 2022-12-02 NOTE — CONSULT NOTE ADULT - SUBJECTIVE AND OBJECTIVE BOX
DIYA IZAGUIRRE is a 36 yo  at 6w6d by LMP 10/15/2022 presents to ED with worsening left lower abdominal pain x1 day with concern for ectopic pregnancy. Pt reports accompanying vaginal bleeding.  She states she had a sono today with her fertility specialist who saw a small structure in the left adnexa. This pregnancy is a result of IVF, pt with bhcg of 280 on 22, with continued drop in bhcg levels, no visual IUP and nothing seen in the adnexa. She denies any fevers, chills, dizziness, palpitations, sob, n/v, or unusual discharge.     ROS:  Gen: no fatigue  CV: no chest pain  Resp: no SOB, wheezing  Neuro: no vision change, headache  GI: no abdominal pain, diarrhea, constipation  : no dysuria, increased frequency, urge  Gyn: no abnormal discharge  ID: no fevers, chills  Int: no rash  MSK: no weakness    PAST MEDICAL & SURGICAL HISTORY:  Biliary dyskinesia  History of alcohol use-sober 7 years  Anxiety      History of surgery  bilateral tissue removed from axillas  [place] teeth removed  egg retrieval  Cholecystectomy         ObHx:  SABx2, TOPx1  GynHx:     no h/o abnormal Paps  denies h/o fibroids, cysts, STIs    SocHx: Denies current toxic habits x3  Allergies: No Known Allergies      Home Medications:  busparone: 2   once a day (at bedtime) (2022 06:47)  Zoloft: 150 milligram(s) orally once a day (2022 06:47)        Height (cm): 172.7 (22 @ 16:00)  Weight (kg): 78.5 (22 @ 16:00)  BMI (kg/m2): 26.3 (22 @ 16:00)  BSA (m2): 1.92 (22 @ 16:00)  T(C): 36.6 (22 @ 16:36), Max: 36.6 (22 @ 16:36)  HR: 58 (22 @ 16:36) (58 - 58)  BP: 94/53 (22 @ 16:36) (94/53 - 94/53)  RR: 18 (22 @ 16:36) (18 - 18)  SpO2: 97% (22 @ 16:36) (97% - 97%)    Physical Exam:  Gen: alert oriented; appears uncomfortable  Pulm: Normal respiratory effort  Abd: soft, mild tenderness to palpation LLQ, no guarding or rebound tenderness  Gyn: deferred 2/2 location                                  13.1   10.19 )-----------( 293      ( 02 Dec 2022 16:55 )             38.5            DIYA IZAGUIRRE is a 36 yo  at 6w6d by LMP 10/15/2022 presents to ED with worsening left lower abdominal pain x1 day with concern for ectopic pregnancy. Pt reports accompanying vaginal bleeding.  She states she had a sono today with her fertility specialist who saw a small structure in the left adnexa. This pregnancy is a result of IVF, pt with bhcg of 280 on 22, with continued drop in bhcg levels, no visual IUP and nothing seen in the adnexa. She denies any fevers, chills, dizziness, palpitations, sob, n/v, or unusual discharge.     ROS:  Gen: no fatigue  CV: no chest pain  Resp: no SOB, wheezing  Neuro: no vision change, headache  GI: no abdominal pain, diarrhea, constipation  : no dysuria, increased frequency, urge  Gyn: no abnormal discharge  ID: no fevers, chills  Int: no rash  MSK: no weakness    PAST MEDICAL & SURGICAL HISTORY:  Biliary dyskinesia  History of alcohol use-sober 7 years  Anxiety      History of surgery  bilateral tissue removed from axillas  [place] teeth removed  egg retrieval  Cholecystectomy         ObHx:  SABx2, TOPx1  GynHx:     no h/o abnormal Paps  denies h/o fibroids, cysts, STIs    SocHx: Denies current toxic habits x3  Allergies: No Known Allergies      Home Medications:  busparone: 2   once a day (at bedtime) (2022 06:47)  Zoloft: 150 milligram(s) orally once a day (2022 06:47)        Height (cm): 172.7 (22 @ 16:00)  Weight (kg): 78.5 (22 @ 16:00)  BMI (kg/m2): 26.3 (22 @ 16:00)  BSA (m2): 1.92 (22 @ 16:00)  T(C): 36.6 (22 @ 16:36), Max: 36.6 (22 @ 16:36)  HR: 58 (22 @ 16:36) (58 - 58)  BP: 94/53 (22 @ 16:36) (94/53 - 94/53)  RR: 18 (22 @ 16:36) (18 - 18)  SpO2: 97% (22 @ 16:36) (97% - 97%)    Physical Exam:  Gen: alert oriented; appears uncomfortable  Pulm: Normal respiratory effort  Abd: soft, mild tenderness to palpation LLQ, no guarding or rebound tenderness                                  13.1   10.19 )-----------( 293      ( 02 Dec 2022 16:55 )             38.5

## 2022-12-02 NOTE — ED STATDOCS - NSICDXPASTMEDICALHX_GEN_ALL_CORE_FT
PAST MEDICAL HISTORY:  Anxiety     Biliary dyskinesia     History of alcohol use sober 7 years

## 2022-12-02 NOTE — ED ADULT TRIAGE NOTE - CHIEF COMPLAINT QUOTE
patient presenting via wheelchair to ED, , 6 weeks pregnant sent in by IVF MD, c/o severe abdominal pain and vaginal bleeding. patient states she's going through approx 1 pad an hour. patient with confirmed ectopic pregnancy via US today. OBGYN dr. lynch.

## 2022-12-03 VITALS
SYSTOLIC BLOOD PRESSURE: 102 MMHG | OXYGEN SATURATION: 97 % | DIASTOLIC BLOOD PRESSURE: 66 MMHG | HEART RATE: 59 BPM | RESPIRATION RATE: 18 BRPM

## 2022-12-03 RX ADMIN — OXYCODONE HYDROCHLORIDE 5 MILLIGRAM(S): 5 TABLET ORAL at 00:01

## 2022-12-05 ENCOUNTER — EMERGENCY (EMERGENCY)
Facility: HOSPITAL | Age: 37
LOS: 0 days | Discharge: HOME | End: 2022-12-05
Attending: EMERGENCY MEDICINE
Payer: COMMERCIAL

## 2022-12-05 VITALS
TEMPERATURE: 98 F | OXYGEN SATURATION: 98 % | RESPIRATION RATE: 18 BRPM | SYSTOLIC BLOOD PRESSURE: 115 MMHG | HEART RATE: 73 BPM | DIASTOLIC BLOOD PRESSURE: 62 MMHG

## 2022-12-05 VITALS — WEIGHT: 173.06 LBS | HEIGHT: 68 IN

## 2022-12-05 DIAGNOSIS — Z20.822 CONTACT WITH AND (SUSPECTED) EXPOSURE TO COVID-19: ICD-10-CM

## 2022-12-05 DIAGNOSIS — R11.0 NAUSEA: ICD-10-CM

## 2022-12-05 DIAGNOSIS — R42 DIZZINESS AND GIDDINESS: ICD-10-CM

## 2022-12-05 DIAGNOSIS — K08.409 PARTIAL LOSS OF TEETH, UNSPECIFIED CAUSE, UNSPECIFIED CLASS: Chronic | ICD-10-CM

## 2022-12-05 DIAGNOSIS — Z98.890 OTHER SPECIFIED POSTPROCEDURAL STATES: Chronic | ICD-10-CM

## 2022-12-05 DIAGNOSIS — Z87.898 PERSONAL HISTORY OF OTHER SPECIFIED CONDITIONS: ICD-10-CM

## 2022-12-05 DIAGNOSIS — N93.9 ABNORMAL UTERINE AND VAGINAL BLEEDING, UNSPECIFIED: ICD-10-CM

## 2022-12-05 DIAGNOSIS — Z90.49 ACQUIRED ABSENCE OF OTHER SPECIFIED PARTS OF DIGESTIVE TRACT: ICD-10-CM

## 2022-12-05 DIAGNOSIS — F41.9 ANXIETY DISORDER, UNSPECIFIED: ICD-10-CM

## 2022-12-05 DIAGNOSIS — Z87.59 PERSONAL HISTORY OF OTHER COMPLICATIONS OF PREGNANCY, CHILDBIRTH AND THE PUERPERIUM: ICD-10-CM

## 2022-12-05 DIAGNOSIS — G89.18 OTHER ACUTE POSTPROCEDURAL PAIN: ICD-10-CM

## 2022-12-05 DIAGNOSIS — Z90.721 ACQUIRED ABSENCE OF OVARIES, UNILATERAL: ICD-10-CM

## 2022-12-05 LAB
ALBUMIN SERPL ELPH-MCNC: 3.2 G/DL — LOW (ref 3.3–5)
ALP SERPL-CCNC: 57 U/L — SIGNIFICANT CHANGE UP (ref 40–120)
ALT FLD-CCNC: 16 U/L — SIGNIFICANT CHANGE UP (ref 12–78)
ANION GAP SERPL CALC-SCNC: 5 MMOL/L — SIGNIFICANT CHANGE UP (ref 5–17)
APPEARANCE UR: CLEAR — SIGNIFICANT CHANGE UP
APTT BLD: 28.6 SEC — SIGNIFICANT CHANGE UP (ref 27.5–35.5)
AST SERPL-CCNC: 12 U/L — LOW (ref 15–37)
BASOPHILS # BLD AUTO: 0.06 K/UL — SIGNIFICANT CHANGE UP (ref 0–0.2)
BASOPHILS NFR BLD AUTO: 0.8 % — SIGNIFICANT CHANGE UP (ref 0–2)
BILIRUB SERPL-MCNC: 0.2 MG/DL — SIGNIFICANT CHANGE UP (ref 0.2–1.2)
BILIRUB UR-MCNC: NEGATIVE — SIGNIFICANT CHANGE UP
BUN SERPL-MCNC: 16 MG/DL — SIGNIFICANT CHANGE UP (ref 7–23)
CALCIUM SERPL-MCNC: 8.8 MG/DL — SIGNIFICANT CHANGE UP (ref 8.5–10.1)
CHLORIDE SERPL-SCNC: 107 MMOL/L — SIGNIFICANT CHANGE UP (ref 96–108)
CO2 SERPL-SCNC: 28 MMOL/L — SIGNIFICANT CHANGE UP (ref 22–31)
COLOR SPEC: YELLOW — SIGNIFICANT CHANGE UP
CREAT SERPL-MCNC: 1.03 MG/DL — SIGNIFICANT CHANGE UP (ref 0.5–1.3)
DIFF PNL FLD: ABNORMAL
EGFR: 72 ML/MIN/1.73M2 — SIGNIFICANT CHANGE UP
EOSINOPHIL # BLD AUTO: 0.09 K/UL — SIGNIFICANT CHANGE UP (ref 0–0.5)
EOSINOPHIL NFR BLD AUTO: 1.3 % — SIGNIFICANT CHANGE UP (ref 0–6)
FLUAV AG NPH QL: SIGNIFICANT CHANGE UP
FLUBV AG NPH QL: SIGNIFICANT CHANGE UP
GLUCOSE SERPL-MCNC: 95 MG/DL — SIGNIFICANT CHANGE UP (ref 70–99)
GLUCOSE UR QL: NEGATIVE — SIGNIFICANT CHANGE UP
HCG SERPL-ACNC: 116 MIU/ML — HIGH
HCT VFR BLD CALC: 37.7 % — SIGNIFICANT CHANGE UP (ref 34.5–45)
HGB BLD-MCNC: 12.5 G/DL — SIGNIFICANT CHANGE UP (ref 11.5–15.5)
IMM GRANULOCYTES NFR BLD AUTO: 0.6 % — SIGNIFICANT CHANGE UP (ref 0–0.9)
INR BLD: 0.97 RATIO — SIGNIFICANT CHANGE UP (ref 0.88–1.16)
KETONES UR-MCNC: NEGATIVE — SIGNIFICANT CHANGE UP
LEUKOCYTE ESTERASE UR-ACNC: ABNORMAL
LIDOCAIN IGE QN: 100 U/L — SIGNIFICANT CHANGE UP (ref 73–393)
LYMPHOCYTES # BLD AUTO: 2.25 K/UL — SIGNIFICANT CHANGE UP (ref 1–3.3)
LYMPHOCYTES # BLD AUTO: 31.5 % — SIGNIFICANT CHANGE UP (ref 13–44)
MCHC RBC-ENTMCNC: 29.9 PG — SIGNIFICANT CHANGE UP (ref 27–34)
MCHC RBC-ENTMCNC: 33.2 GM/DL — SIGNIFICANT CHANGE UP (ref 32–36)
MCV RBC AUTO: 90.2 FL — SIGNIFICANT CHANGE UP (ref 80–100)
MONOCYTES # BLD AUTO: 0.59 K/UL — SIGNIFICANT CHANGE UP (ref 0–0.9)
MONOCYTES NFR BLD AUTO: 8.3 % — SIGNIFICANT CHANGE UP (ref 2–14)
NEUTROPHILS # BLD AUTO: 4.12 K/UL — SIGNIFICANT CHANGE UP (ref 1.8–7.4)
NEUTROPHILS NFR BLD AUTO: 57.5 % — SIGNIFICANT CHANGE UP (ref 43–77)
NITRITE UR-MCNC: NEGATIVE — SIGNIFICANT CHANGE UP
PH UR: 5 — SIGNIFICANT CHANGE UP (ref 5–8)
PLATELET # BLD AUTO: 249 K/UL — SIGNIFICANT CHANGE UP (ref 150–400)
POTASSIUM SERPL-MCNC: 4.2 MMOL/L — SIGNIFICANT CHANGE UP (ref 3.5–5.3)
POTASSIUM SERPL-SCNC: 4.2 MMOL/L — SIGNIFICANT CHANGE UP (ref 3.5–5.3)
PROT SERPL-MCNC: 7.3 GM/DL — SIGNIFICANT CHANGE UP (ref 6–8.3)
PROT UR-MCNC: NEGATIVE — SIGNIFICANT CHANGE UP
PROTHROM AB SERPL-ACNC: 11.2 SEC — SIGNIFICANT CHANGE UP (ref 10.5–13.4)
RBC # BLD: 4.18 M/UL — SIGNIFICANT CHANGE UP (ref 3.8–5.2)
RBC # FLD: 12.4 % — SIGNIFICANT CHANGE UP (ref 10.3–14.5)
RSV RNA NPH QL NAA+NON-PROBE: SIGNIFICANT CHANGE UP
SARS-COV-2 RNA SPEC QL NAA+PROBE: SIGNIFICANT CHANGE UP
SODIUM SERPL-SCNC: 140 MMOL/L — SIGNIFICANT CHANGE UP (ref 135–145)
SP GR SPEC: 1.01 — SIGNIFICANT CHANGE UP (ref 1.01–1.02)
UROBILINOGEN FLD QL: NEGATIVE — SIGNIFICANT CHANGE UP
WBC # BLD: 7.15 K/UL — SIGNIFICANT CHANGE UP (ref 3.8–10.5)
WBC # FLD AUTO: 7.15 K/UL — SIGNIFICANT CHANGE UP (ref 3.8–10.5)

## 2022-12-05 PROCEDURE — 96374 THER/PROPH/DIAG INJ IV PUSH: CPT

## 2022-12-05 PROCEDURE — 86077 PHYS BLOOD BANK SERV XMATCH: CPT

## 2022-12-05 PROCEDURE — 86900 BLOOD TYPING SEROLOGIC ABO: CPT

## 2022-12-05 PROCEDURE — 99284 EMERGENCY DEPT VISIT MOD MDM: CPT | Mod: 25

## 2022-12-05 PROCEDURE — 85730 THROMBOPLASTIN TIME PARTIAL: CPT

## 2022-12-05 PROCEDURE — 74177 CT ABD & PELVIS W/CONTRAST: CPT | Mod: 26,MA

## 2022-12-05 PROCEDURE — 74177 CT ABD & PELVIS W/CONTRAST: CPT | Mod: MA

## 2022-12-05 PROCEDURE — 80053 COMPREHEN METABOLIC PANEL: CPT

## 2022-12-05 PROCEDURE — 96372 THER/PROPH/DIAG INJ SC/IM: CPT | Mod: XU

## 2022-12-05 PROCEDURE — 96375 TX/PRO/DX INJ NEW DRUG ADDON: CPT

## 2022-12-05 PROCEDURE — 36415 COLL VENOUS BLD VENIPUNCTURE: CPT

## 2022-12-05 PROCEDURE — 81001 URINALYSIS AUTO W/SCOPE: CPT

## 2022-12-05 PROCEDURE — 85025 COMPLETE CBC W/AUTO DIFF WBC: CPT

## 2022-12-05 PROCEDURE — 83690 ASSAY OF LIPASE: CPT

## 2022-12-05 PROCEDURE — 86880 COOMBS TEST DIRECT: CPT

## 2022-12-05 PROCEDURE — 86870 RBC ANTIBODY IDENTIFICATION: CPT

## 2022-12-05 PROCEDURE — 96376 TX/PRO/DX INJ SAME DRUG ADON: CPT

## 2022-12-05 PROCEDURE — 90384 RH IG FULL-DOSE IM: CPT

## 2022-12-05 PROCEDURE — 86850 RBC ANTIBODY SCREEN: CPT

## 2022-12-05 PROCEDURE — 84702 CHORIONIC GONADOTROPIN TEST: CPT

## 2022-12-05 PROCEDURE — 86901 BLOOD TYPING SEROLOGIC RH(D): CPT

## 2022-12-05 PROCEDURE — 85610 PROTHROMBIN TIME: CPT

## 2022-12-05 PROCEDURE — 99285 EMERGENCY DEPT VISIT HI MDM: CPT

## 2022-12-05 PROCEDURE — 87086 URINE CULTURE/COLONY COUNT: CPT

## 2022-12-05 PROCEDURE — 0241U: CPT

## 2022-12-05 RX ORDER — OXYCODONE HYDROCHLORIDE 5 MG/1
1 TABLET ORAL
Qty: 9 | Refills: 0
Start: 2022-12-05

## 2022-12-05 RX ORDER — ONDANSETRON 8 MG/1
4 TABLET, FILM COATED ORAL ONCE
Refills: 0 | Status: COMPLETED | OUTPATIENT
Start: 2022-12-05 | End: 2022-12-05

## 2022-12-05 RX ORDER — SODIUM CHLORIDE 9 MG/ML
1000 INJECTION INTRAMUSCULAR; INTRAVENOUS; SUBCUTANEOUS ONCE
Refills: 0 | Status: COMPLETED | OUTPATIENT
Start: 2022-12-05 | End: 2022-12-05

## 2022-12-05 RX ORDER — FENTANYL CITRATE 50 UG/ML
50 INJECTION INTRAVENOUS ONCE
Refills: 0 | Status: DISCONTINUED | OUTPATIENT
Start: 2022-12-05 | End: 2022-12-05

## 2022-12-05 RX ADMIN — ONDANSETRON 4 MILLIGRAM(S): 8 TABLET, FILM COATED ORAL at 19:28

## 2022-12-05 RX ADMIN — FENTANYL CITRATE 50 MICROGRAM(S): 50 INJECTION INTRAVENOUS at 19:28

## 2022-12-05 RX ADMIN — SODIUM CHLORIDE 1000 MILLILITER(S): 9 INJECTION INTRAMUSCULAR; INTRAVENOUS; SUBCUTANEOUS at 19:28

## 2022-12-05 RX ADMIN — FENTANYL CITRATE 50 MICROGRAM(S): 50 INJECTION INTRAVENOUS at 22:14

## 2022-12-05 NOTE — CONSULT NOTE ADULT - SUBJECTIVE AND OBJECTIVE BOX
DIYA IZAGUIRRE is a 36 yo  POD#3 s/p emergent laparoscopic surgery for ruptured left fallopian ectopic pregnancy after IVF. Gyn consulted to evaluate. Pt states she felt okay after her procedure but yesterday and today started experiencing increased abdominal pain and vaginal bleeding. She states she is passing small clots and felt lightheaded/dizzy upon initial arrival to ED but not currently. She reports taking tylenol and motrin q4h without relief. She also endorses mild nausea. Denies fever/chills.     PAST MEDICAL & SURGICAL HISTORY:  Biliary dyskinesia  History of alcohol use-sober 7 years  Anxiety    History of surgery  bilateral tissue removed from axillas  [place] teeth removed  egg retrieval  Cholecystectomy   left salpingectomy 2/2 ectopic pregnancy    ObHx:  SABx2, TOPx1, ectopic pregnancy (lt fallopian tube s/p salpingectomy)  GynHx:     no h/o abnormal Paps  denies h/o fibroids, cysts, STIs    SocHx: Denies current toxic habits x3  Allergies: No Known Allergies    Home Medications:  busparone: 2   once a day (at bedtime) (2022 06:47)  Zoloft: 150 milligram(s) orally once a day (2022 06:47)  tylenol/motrin prn    Vital Signs Last 24 Hrs  T(C): 36.7 (05 Dec 2022 18:06), Max: 36.7 (05 Dec 2022 18:06)  T(F): 98 (05 Dec 2022 18:06), Max: 98 (05 Dec 2022 18:06)  HR: 73 (05 Dec 2022 18:06) (73 - 73)  BP: 115/62 (05 Dec 2022 18:06) (115/62 - 115/62)  BP(mean): 78 (05 Dec 2022 18:06) (78 - 78)  RR: 18 (05 Dec 2022 18:06) (18 - 18)  SpO2: 98% (05 Dec 2022 18:06) (98% - 98%)    Parameters below as of 05 Dec 2022 18:06  Patient On (Oxygen Delivery Method): room air    Gen: well-appearing, NAD  Neuro: A&Ox3   Resp: breathing comfortably on RA  Abd: periumbilical tenderness, firmness 2cm above umbilicus, abdomen otherwise soft, nondistended  VE; no active bleeding on spec, no masses palpated on digital exam, cervix closed                       12.5   7.15  )-----------( 249      ( 05 Dec 2022 18:55 )             37.7     12-    140  |  107  |  16  ----------------------------<  95  4.2   |  28  |  1.03    Ca    8.8      05 Dec 2022 18:55    TPro  7.3  /  Alb  3.2<L>  /  TBili  0.2  /  DBili  x   /  AST  12<L>  /  ALT  16  /  AlkPhos  57  12-05    ABO RH Interpretation: A NEG (22 @ 16:55)     DIYA IZAGUIRRE is a 36 yo  POD#3 s/p emergent laparoscopic surgery for ruptured left fallopian ectopic pregnancy after IVF. Gyn consulted to evaluate. Pt states she felt okay after her procedure but yesterday and today started experiencing increased abdominal pain and vaginal bleeding. She states she is passing small clots and felt lightheaded/dizzy upon initial arrival to ED but not currently. She reports taking tylenol and motrin q4h without relief. She also endorses mild nausea. Denies fever/chills.     PAST MEDICAL & SURGICAL HISTORY:  Biliary dyskinesia  History of alcohol use-sober 7 years  Anxiety    History of surgery  bilateral tissue removed from axillas  [place] teeth removed  egg retrieval  Cholecystectomy   left salpingectomy 2/2 ectopic pregnancy    ObHx:  SABx2, TOPx1, ectopic pregnancy (lt fallopian tube s/p salpingectomy)  GynHx:     no h/o abnormal Paps  denies h/o fibroids, cysts, STIs    SocHx: Denies current toxic habits x3  Allergies: No Known Allergies    Home Medications:  busparone: 2   once a day (at bedtime) (2022 06:47)  Zoloft: 150 milligram(s) orally once a day (2022 06:47)  tylenol/motrin prn    Vital Signs Last 24 Hrs  T(C): 36.7 (05 Dec 2022 18:06), Max: 36.7 (05 Dec 2022 18:06)  T(F): 98 (05 Dec 2022 18:06), Max: 98 (05 Dec 2022 18:06)  HR: 73 (05 Dec 2022 18:06) (73 - 73)  BP: 115/62 (05 Dec 2022 18:06) (115/62 - 115/62)  BP(mean): 78 (05 Dec 2022 18:06) (78 - 78)  RR: 18 (05 Dec 2022 18:06) (18 - 18)  SpO2: 98% (05 Dec 2022 18:06) (98% - 98%)    Parameters below as of 05 Dec 2022 18:06  Patient On (Oxygen Delivery Method): room air    Gen: well-appearing, NAD  Neuro: A&Ox3   Resp: breathing comfortably on RA  Abd: periumbilical tenderness, firmness 2cm above umbilicus, abdomen otherwise soft, nondistended  VE; no active bleeding on spec, no masses palpated on digital exam, cervix closed                       12.5   7.15  )-----------( 249      ( 05 Dec 2022 18:55 )             37.7     12-    140  |  107  |  16  ----------------------------<  95  4.2   |  28  |  1.03    Ca    8.8      05 Dec 2022 18:55    TPro  7.3  /  Alb  3.2<L>  /  TBili  0.2  /  DBili  x   /  AST  12<L>  /  ALT  16  /  AlkPhos  57  12-    ABO RH Interpretation: A NEG (22 @ 16:55)    HCG Quantitative, Serum: 116: HCG-Quantitative test interpretations: This test is intended only for the  detection & monitoring of pregnancy. For oncology studies order the tumor  marker test “HCG-TM” (hCG-Tumor Marker).  For pregnancy evaluation the reference values are as follows:  Negative:  <=4 mIU/mL  Indeterminate:  5 - 25 mIU/mL (suggest repeat testing in 72 hours)  Positive:  > 25 mIU/mL  Reference Values during Pregnancy:  Weeks after LMP* REFERENCE INTERVAL mIU/mL     3      6 - 71     4      10 - 750     5      220 - 7,100     6      160 - 32,000     7      3,700 - 164,000     8      32,000 - 150,000     9      64,000 - 151,000     10      47,000 - 187,000     12      28,000 - 211,000     14      14,000 - 63,000     15      12,000 - 71,000     16 - 18  8,000 - 58,000  * LMP:  Last Menstrual Period  HCG results of false positive and false negative for pregnancy are rare,  but can occur with this, and other, hCG tests. [place]- and post-menopausal  females may have mildly elevated hCG concentrations usually less than 14  mIU/mL that are constant over time. mIU/mL (22 @ 18:55)        < from: CT Abdomen and Pelvis w/ IV Cont (22 @ 19:55) >  FINDINGS:  LOWER CHEST: Within normal limits.    LIVER: Within normal limits.  BILE DUCTS: Normal caliber.  GALLBLADDER: Removed.  SPLEEN: Within normal limits.  PANCREAS: Within normal limits.  ADRENALS: Within normal limits.  KIDNEYS/URETERS: Within normal limits.    BLADDER: 2 tiny bubbles of nondependent gas in the distended bladder.  REPRODUCTIVE ORGANS: Clips in the left pelvis, compatible with   salpingectomy. Otherwise, the uterus and adnexa are unremarkable by CT   criteria.    BOWEL: No bowel obstruction. Appendix is normal.  PERITONEUM: No ascites.  VESSELS: Within normal limits.  RETROPERITONEUM/LYMPH NODES: No lymphadenopathy.  ABDOMINAL WALL: There is mild skin thickening of the umbilicus with mild   infiltration of the adjacent subcutaneous fat with tiny bubbles of gas,   compatible with recent laparoscopic surgery.  BONES: Within normal limits.    IMPRESSION: Postoperative changes of the subcutaneous tissues adjacent to   the umbilicus. No evidence of intra-abdominal abscess.    < end of copied text >

## 2022-12-05 NOTE — ED ADULT TRIAGE NOTE - CHIEF COMPLAINT QUOTE
Pt presents to ED s/p ectopic pregnancy with emergent fallopian tube removal on Friday. returns to ED for worsening abdominal pain to RLQ radiating to back x 48 hrs. endorses vaginal bleeding with clots, dizziness, nausea. awake and alert.

## 2022-12-05 NOTE — ED STATDOCS - OBJECTIVE STATEMENT
38 y/o female presents to ED s/p ectopic pregnancy with emergent fallopian tube removal on Friday, 12/2/22. Pt returns to ED for worsening abdominal pain to RLQ radiating to back x 48 hrs. Pt also reports vaginal bleeding with clots, dizziness, and nausea. Pt reports LMP was October, 2022 because she is doing IVF.

## 2022-12-05 NOTE — CONSULT NOTE ADULT - ATTENDING COMMENTS
36 yo  POD#3 s/p emergent laparoscopic surgery for ruptured left fallopian ectopic pregnancy after IVF.  - vital signs and lab work reassuring   - CT scan without major concerns   - physical exam consistent with superficial hematoma   - incision site healing well, no signs of infection   - given conservative measures for home including continued pain management with Tylenol and Motrin around the clock, warm packs, and adequate hydration   - will f/u in office, given return precautions

## 2022-12-05 NOTE — ED STATDOCS - NSICDXPASTSURGICALHX_GEN_ALL_CORE_FT
PAST SURGICAL HISTORY:  History of surgery bilateral tissue removed from axillas    History of surgery egg retrieval    [place] teeth removed

## 2022-12-05 NOTE — CONSULT NOTE ADULT - ASSESSMENT
DIYA IZAGUIRRE is a 38 yo  POD#3 s/p emergent laparoscopic surgery for ruptured left fallopian ectopic pregnancy after IVF.    - VSS  - abdominal exam significant for superficial hematoma, discussed with patient pain management with tylenol/motrin/oxy  - vaginal exam reassuring, no active bleeding, Hg stable from prior admission   - CT reassuring  - A neg, administer Rh prior to dc  - hcg decreased, continue to trend  - return precautions reviewed  - pt to f/u w Dr. Nava w/in the wk    evaluated at bedside w Dr. Sullivan

## 2022-12-05 NOTE — ED STATDOCS - PROGRESS NOTE DETAILS
Patient seen with ED attending at intake.  3 days postop reporting severe abdominal pain.  Case d/w ob/gyn resident, labs and CT pending -Christopher Perales PA-C Patient was seen in the department by ob/gyn.  advised pain control and follow up in the office.  return precautions reviewed -Christopher Perales PA-C Patient with rh negative blood, questionable if rhogam was given on Friday, will give dose at this time as it is unclear.  d/w blood bank and on call obgyn attending Dr. Nate Perales PA-C

## 2022-12-05 NOTE — ED STATDOCS - CLINICAL SUMMARY MEDICAL DECISION MAKING FREE TEXT BOX
Concern for hemoperitoneum. Consider appy, UTI. CT ab/pel, touch base with OB, provide pain meds and reassess. Concern for hemoperitoneum. Consider appy, UTI. CT ab/pel, will discuss with OB, provide pain meds and reassess.

## 2022-12-05 NOTE — ED ADULT NURSE NOTE - OBJECTIVE STATEMENT
Pt brought to ED from home with complaints of right sided abdominal pain. Pt received resting in chair. Pt's  at bedside. As per Pt, she was seen in ED on Friday with a left-sided ectopic pregnancy, removed on Friday. Pt states right-sided pain has been progressively getting worse since Friday. Pt denies n/v/d. Pt endorses decreased PO intake. Pt denies taking anything for the pain. No additional requests or complaints. Patient safety maintained. Will continue to monitor.

## 2022-12-05 NOTE — ED STATDOCS - PATIENT PORTAL LINK FT
You can access the FollowMyHealth Patient Portal offered by [facility] by registering at the following website: [email or web address]. By joining [employer]’s FollowMyHealth portal, you will also be able to view your health information using other applications (apps) compatible with our system.

## 2022-12-05 NOTE — ED STATDOCS - PHYSICAL EXAMINATION
Constitutional: NAD, well appearing  HEENT: no rhinorrhea, PERRL, no oropharyngeal erythema or exudates, midline uvula.  TMs clear.  CVS:  RRR, no m/r/g  Resp:  CTAB  GI: soft. +mild to moderate TTP RUQ and RLQ  MSK:  no restriction to rom, full ROM to all extremities  Neuro:  A&Ox3, 5/5 strength to all extremities,  SILT to all extremities  Skin: no rash  psych: clear thought content  Heme/lymph:  No LAD

## 2022-12-05 NOTE — ED ADULT NURSE NOTE - NSICDXPASTSURGICALHX_GEN_ALL_CORE_FT
PAST SURGICAL HISTORY:  History of surgery bilateral tissue removed from axillas    History of surgery egg retrieval    [place] teeth removed

## 2022-12-05 NOTE — ED STATDOCS - NS ED ROS FT
Constitutional: nad, well appearing  HEENT:  no nasal congestion, eye drainage or ear pain.    CVS:  no cp  Resp:  No sob, no cough  GI:  + abdominal pain, + nausea. No vomiting  :  no dysuria. +vaginal bleeding.  MSK: no joint pain or limited ROM. +back pain  Skin: no rash  Neuro: no change in mental status or level of consciousness. +dizziness  Heme/lymph: no bleeding

## 2022-12-05 NOTE — ED ADULT NURSE REASSESSMENT NOTE - NS ED NURSE REASSESS COMMENT FT1
Reviewed Rhogam administration with OBGYN resident, to be administered via IM, Pt does not require monitoring afterwards.

## 2022-12-08 LAB
CULTURE RESULTS: SIGNIFICANT CHANGE UP
SPECIMEN SOURCE: SIGNIFICANT CHANGE UP

## 2022-12-11 ENCOUNTER — EMERGENCY (EMERGENCY)
Facility: HOSPITAL | Age: 37
LOS: 0 days | Discharge: HOME | End: 2022-12-11
Attending: EMERGENCY MEDICINE
Payer: COMMERCIAL

## 2022-12-11 VITALS
SYSTOLIC BLOOD PRESSURE: 103 MMHG | OXYGEN SATURATION: 98 % | HEART RATE: 90 BPM | TEMPERATURE: 98 F | RESPIRATION RATE: 18 BRPM | DIASTOLIC BLOOD PRESSURE: 70 MMHG

## 2022-12-11 VITALS — HEIGHT: 68 IN | WEIGHT: 173.06 LBS

## 2022-12-11 DIAGNOSIS — T81.43XA INFECTION FOLLOWING A PROCEDURE, ORGAN AND SPACE SURGICAL SITE, INITIAL ENCOUNTER: ICD-10-CM
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
## 2022-12-11 DIAGNOSIS — R21 RASH AND OTHER NONSPECIFIC SKIN ERUPTION: ICD-10-CM

## 2022-12-11 DIAGNOSIS — Z86.59 PERSONAL HISTORY OF OTHER MENTAL AND BEHAVIORAL DISORDERS: ICD-10-CM

## 2022-12-11 DIAGNOSIS — R50.9 FEVER, UNSPECIFIED: ICD-10-CM

## 2022-12-11 DIAGNOSIS — Z98.890 OTHER SPECIFIED POSTPROCEDURAL STATES: Chronic | ICD-10-CM

## 2022-12-11 DIAGNOSIS — Z90.721 ACQUIRED ABSENCE OF OVARIES, UNILATERAL: ICD-10-CM

## 2022-12-11 DIAGNOSIS — F41.9 ANXIETY DISORDER, UNSPECIFIED: ICD-10-CM

## 2022-12-11 DIAGNOSIS — K08.409 PARTIAL LOSS OF TEETH, UNSPECIFIED CAUSE, UNSPECIFIED CLASS: Chronic | ICD-10-CM

## 2022-12-11 DIAGNOSIS — L02.91 CUTANEOUS ABSCESS, UNSPECIFIED: ICD-10-CM
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
## 2022-12-11 DIAGNOSIS — Z90.49 ACQUIRED ABSENCE OF OTHER SPECIFIED PARTS OF DIGESTIVE TRACT: ICD-10-CM

## 2022-12-11 LAB
ALBUMIN SERPL ELPH-MCNC: 3.4 G/DL — SIGNIFICANT CHANGE UP (ref 3.3–5)
ALP SERPL-CCNC: 73 U/L — SIGNIFICANT CHANGE UP (ref 40–120)
ALT FLD-CCNC: 15 U/L — SIGNIFICANT CHANGE UP (ref 12–78)
ANION GAP SERPL CALC-SCNC: 6 MMOL/L — SIGNIFICANT CHANGE UP (ref 5–17)
AST SERPL-CCNC: 9 U/L — LOW (ref 15–37)
BASOPHILS # BLD AUTO: 0.05 K/UL — SIGNIFICANT CHANGE UP (ref 0–0.2)
BASOPHILS NFR BLD AUTO: 0.6 % — SIGNIFICANT CHANGE UP (ref 0–2)
BILIRUB SERPL-MCNC: 0.5 MG/DL — SIGNIFICANT CHANGE UP (ref 0.2–1.2)
BUN SERPL-MCNC: 12 MG/DL — SIGNIFICANT CHANGE UP (ref 7–23)
CALCIUM SERPL-MCNC: 9.5 MG/DL — SIGNIFICANT CHANGE UP (ref 8.5–10.1)
CHLORIDE SERPL-SCNC: 107 MMOL/L — SIGNIFICANT CHANGE UP (ref 96–108)
CO2 SERPL-SCNC: 25 MMOL/L — SIGNIFICANT CHANGE UP (ref 22–31)
CREAT SERPL-MCNC: 0.88 MG/DL — SIGNIFICANT CHANGE UP (ref 0.5–1.3)
EGFR: 87 ML/MIN/1.73M2 — SIGNIFICANT CHANGE UP
EOSINOPHIL # BLD AUTO: 0.08 K/UL — SIGNIFICANT CHANGE UP (ref 0–0.5)
EOSINOPHIL NFR BLD AUTO: 0.9 % — SIGNIFICANT CHANGE UP (ref 0–6)
GLUCOSE SERPL-MCNC: 94 MG/DL — SIGNIFICANT CHANGE UP (ref 70–99)
HCT VFR BLD CALC: 37.9 % — SIGNIFICANT CHANGE UP (ref 34.5–45)
HGB BLD-MCNC: 12.7 G/DL — SIGNIFICANT CHANGE UP (ref 11.5–15.5)
IMM GRANULOCYTES NFR BLD AUTO: 0.8 % — SIGNIFICANT CHANGE UP (ref 0–0.9)
LACTATE SERPL-SCNC: 0.7 MMOL/L — SIGNIFICANT CHANGE UP (ref 0.7–2)
LYMPHOCYTES # BLD AUTO: 1.6 K/UL — SIGNIFICANT CHANGE UP (ref 1–3.3)
LYMPHOCYTES # BLD AUTO: 17.8 % — SIGNIFICANT CHANGE UP (ref 13–44)
MCHC RBC-ENTMCNC: 29.6 PG — SIGNIFICANT CHANGE UP (ref 27–34)
MCHC RBC-ENTMCNC: 33.5 GM/DL — SIGNIFICANT CHANGE UP (ref 32–36)
MCV RBC AUTO: 88.3 FL — SIGNIFICANT CHANGE UP (ref 80–100)
MONOCYTES # BLD AUTO: 0.9 K/UL — SIGNIFICANT CHANGE UP (ref 0–0.9)
MONOCYTES NFR BLD AUTO: 10 % — SIGNIFICANT CHANGE UP (ref 2–14)
NEUTROPHILS # BLD AUTO: 6.28 K/UL — SIGNIFICANT CHANGE UP (ref 1.8–7.4)
NEUTROPHILS NFR BLD AUTO: 69.9 % — SIGNIFICANT CHANGE UP (ref 43–77)
PLATELET # BLD AUTO: 288 K/UL — SIGNIFICANT CHANGE UP (ref 150–400)
POTASSIUM SERPL-MCNC: 4.3 MMOL/L — SIGNIFICANT CHANGE UP (ref 3.5–5.3)
POTASSIUM SERPL-SCNC: 4.3 MMOL/L — SIGNIFICANT CHANGE UP (ref 3.5–5.3)
PROT SERPL-MCNC: 7.8 GM/DL — SIGNIFICANT CHANGE UP (ref 6–8.3)
RBC # BLD: 4.29 M/UL — SIGNIFICANT CHANGE UP (ref 3.8–5.2)
RBC # FLD: 12.1 % — SIGNIFICANT CHANGE UP (ref 10.3–14.5)
SODIUM SERPL-SCNC: 138 MMOL/L — SIGNIFICANT CHANGE UP (ref 135–145)
WBC # BLD: 8.98 K/UL — SIGNIFICANT CHANGE UP (ref 3.8–10.5)
WBC # FLD AUTO: 8.98 K/UL — SIGNIFICANT CHANGE UP (ref 3.8–10.5)

## 2022-12-11 PROCEDURE — 83605 ASSAY OF LACTIC ACID: CPT

## 2022-12-11 PROCEDURE — 87070 CULTURE OTHR SPECIMN AEROBIC: CPT

## 2022-12-11 PROCEDURE — 99285 EMERGENCY DEPT VISIT HI MDM: CPT

## 2022-12-11 PROCEDURE — 99284 EMERGENCY DEPT VISIT MOD MDM: CPT | Mod: 25

## 2022-12-11 PROCEDURE — 74177 CT ABD & PELVIS W/CONTRAST: CPT | Mod: MA

## 2022-12-11 PROCEDURE — 87040 BLOOD CULTURE FOR BACTERIA: CPT

## 2022-12-11 PROCEDURE — 36415 COLL VENOUS BLD VENIPUNCTURE: CPT

## 2022-12-11 PROCEDURE — 85025 COMPLETE CBC W/AUTO DIFF WBC: CPT

## 2022-12-11 PROCEDURE — 80053 COMPREHEN METABOLIC PANEL: CPT

## 2022-12-11 PROCEDURE — 87186 SC STD MICRODIL/AGAR DIL: CPT

## 2022-12-11 PROCEDURE — 74177 CT ABD & PELVIS W/CONTRAST: CPT | Mod: 26,MA

## 2022-12-11 PROCEDURE — 99283 EMERGENCY DEPT VISIT LOW MDM: CPT

## 2022-12-11 RX ADMIN — Medication 1 TABLET(S): at 10:38

## 2022-12-11 NOTE — ED ADULT NURSE NOTE - NS ED NURSE LEVEL OF CONSCIOUSNESS MENTAL STATUS
Writer spoke with patient and she would like a to discuss MOHS surgery over the phone with NP. Patient does state that she will be gone all next week and will like to get a call today if possible.    Awake/Alert/Cooperative

## 2022-12-11 NOTE — ED PROVIDER NOTE - NSICDXPASTSURGICALHX_GEN_ALL_CORE_FT
PAST SURGICAL HISTORY:  History of surgery bilateral tissue removed from axillas    History of surgery egg retrieval    [place] teeth removed

## 2022-12-11 NOTE — ED ADULT NURSE NOTE - CHIEF COMPLAINT QUOTE
Pt presents to the ED c/o pain and low-grade fevers s/p surgery with Dr. Beckford. Pt had an ectopic pregnancy and underwent surgery to have her left fallopian tube removed on 12/2. Pt came to [facility] on 12/5 for pain and was discharged home with pain medication. Pt followed up with her fertility specialist Dr. Ford and was told to come to the ED if symptoms worsen. Incision site noted to be warm and red, no drainage noted.

## 2022-12-11 NOTE — ED PROVIDER NOTE - NS ED ROS FT
Constitutional: No fever or chills  Eyes: No visual changes  HEENT: No throat pain  CV: No chest pain  Resp: No SOB no cough  GI: No abd pain, nausea or vomiting  : No dysuria  MSK: No musculoskeletal pain  Skin: + rash  Neuro: No headache

## 2022-12-11 NOTE — CONSULT NOTE ADULT - NSCONSULTADDITIONALINFOA_GEN_ALL_CORE
MD-1 Note    I personally obtained a thorough history and performed a physical examination. Upon review of resulted labs and CT scan, the patient's presentation is c/w a surgical site wound infection. Antibiotics were prescribed. The patient is stable for discharge. She will follow-up with her private gynecologist next week.

## 2022-12-11 NOTE — ED PROVIDER NOTE - OBJECTIVE STATEMENT
37-year-old female -0-3-0 postop from ruptured ectopic on  presents the emergency department for redness around her incision sites around the umbilicus.  Patient noticed symptoms starting yesterday patient states the area feels warm and tender.  No fevers no vomiting no abdominal pain no other symptoms no discharge from the wound

## 2022-12-11 NOTE — ED ADULT NURSE NOTE - BREATHING, MLM
Spontaneous, unlabored and symmetrical You can access the FollowMyHealth Patient Portal offered by [facility] by registering at the following website: [email or web address]. By joining [employer]’s FollowMyHealth portal, you will also be able to view your health information using other applications (apps) compatible with our system.

## 2022-12-11 NOTE — ED PROVIDER NOTE - PHYSICAL EXAMINATION
Constitutional: NAD AAOx3  Eyes: PERRLA EOMI  Head: Normocephalic atraumatic  Mouth: MMM  Cardiac: regular rate   Resp: Lungs CTAB  GI: Abd s/nt/nd  Neuro: CN2-12 intact  Skin: + redness warmth and induration of surgical incision site around umbilicus. no fluctuance, crepitus + pus draining from wound

## 2022-12-11 NOTE — ED PROVIDER NOTE - PROGRESS NOTE DETAILS
called obgyn will see patient shortly. Leonel Rodriguez M.D., Attending Physician spoke with ob will see patient seen by OBGYN - ok to dc with primary ob follow up and abx. pt in agreement. abd soft non-tender. no signs of sepsis. Leonel Rodriguez M.D., Attending Physician

## 2022-12-11 NOTE — CONSULT NOTE ADULT - ASSESSMENT
DIYA IZAGUIRRE is a 38 yo  POD#9 s/p emergent laparoscopic salpingectomy for ruptured left fallopian ectopic pregnancy, presenting with superficial surgical site infection.     A/P:  - VSS, afebrile, normotensive, normal RR and HR  - Hg/HCT: 12.7/37.9; WBC: 8.98  - Patient received Rhogam on , after completion of laparoscopic left salpingectomy   - CT from today : New umbilical abscess (5x3x3) with surrounding cellulitis. No abscess in abdominal cavity.   - Purulent drainage cultured, will f/u on results.   - Recommend 10 day outpatient antibiotic course with Bactrim  - Patient to f/u with outpatient GYN provider this week.  - Strict return precautions given  - Patient verbalized understanding and agreement with plan    D/w and seen at bedside with Dr. Myers  DIYA IZAGUIRRE is a 38 yo  POD#9 s/p emergent laparoscopic salpingectomy for ruptured left fallopian ectopic pregnancy, presenting with superficial surgical site infection.     A/P:  - VSS, afebrile, normotensive, normal RR and HR  - Hg/HCT: 12.7/37.9; WBC: 8.98  - Patient received Rhogam on , after completion of laparoscopic left salpingectomy   - CT from today : New umbilical abscess (5x3x3) with surrounding cellulitis. No abscess in abdominal cavity.   - Purulent drainage cultured, will f/u on results.   - Recommend 10 day outpatient antibiotic course with Bactrim DS, Rx sent   - Patient to f/u with outpatient GYN provider this week.  - Strict return precautions given  - Patient verbalized understanding and agreement with plan    D/w and seen at bedside with Dr. Myers

## 2022-12-11 NOTE — ED PROVIDER NOTE - PATIENT PORTAL LINK FT
You can access the FollowMyHealth Patient Portal offered by [facility] by registering at the following website: [email or web address]. By joining [employer]’s FollowMyHealth portal, you will also be able to view your health information using other applications (apps) compatible with our system.

## 2022-12-11 NOTE — ED PROVIDER NOTE - COVID-19  TEST TYPE
Review of Systems   Constitutional: Negative.    HENT:  Negative.    Eyes: Negative.    Respiratory: Negative.    Cardiovascular: Negative.    Gastrointestinal: Negative.    Endocrine: Negative.    Genitourinary: Negative.     Musculoskeletal: Positive for back pain. Negative for arthralgias, flank pain, gait problem, myalgias and neck pain.        Reports chronic low back pain   Skin: Negative.    Neurological: Positive for numbness. Negative for dizziness, extremity weakness, gait problem, headaches, light-headedness, seizures and speech difficulty.   Hematological: Negative.    Psychiatric/Behavioral: Negative.      Completed RT 1/6/20, here for routine f/u. Reports mild numbness of RUE. Denied breast pain. Continues anastrozole under the direction of Dr Menard. Has good energy level and appetite.   MOLECULAR PCR

## 2022-12-11 NOTE — ED ADULT NURSE NOTE - NSICDXPASTSURGICALHX_GEN_ALL_CORE_FT
PAST SURGICAL HISTORY:  History of surgery bilateral tissue removed from axillas    History of surgery egg retrieval    [place] teeth removed

## 2022-12-11 NOTE — ED ADULT TRIAGE NOTE - CHIEF COMPLAINT QUOTE
Pt presents to the ED c/o pain and low-grade fevers s/p surgery with Dr. Beckford. Pt had an ectopic pregnancy and underwent surgery on her left fallopian tube on 12/3. Pt came to [facility] on 12/6 for pain and was discharged home with pain medication. Pt followed up with her fertility specialist Dr. Ford and was told to come to the ED if symptoms worsen. Incision site noted to be warm and red, no drainage noted. Pt presents to the ED c/o pain and low-grade fevers s/p surgery with Dr. Beckford. Pt had an ectopic pregnancy and underwent surgery to have her left fallopian tube removed on 12/2. Pt came to [facility] on 12/5 for pain and was discharged home with pain medication. Pt followed up with her fertility specialist Dr. Ford and was told to come to the ED if symptoms worsen. Incision site noted to be warm and red, no drainage noted.

## 2022-12-11 NOTE — ED PROVIDER NOTE - NSFOLLOWUPINSTRUCTIONS_ED_ALL_ED_FT
1. return for worsening symptoms or anything concerning to you  2. take all home meds as prescribed  3. follow up with your pmd call to make an appointment  4. take bactrim as directed  5. follow up with your obgyn    Skin Abscess  ImageA skin abscess is an infected area on or under your skin that contains pus and other material. An abscess can happen almost anywhere on your body. Some abscesses break open (rupture) on their own. Most continue to get worse unless they are treated. The infection can spread deeper into the body and into your blood, which can make you feel sick. Treatment usually involves draining the abscess.    Follow these instructions at home:  Abscess Care     If you have an abscess that has not drained, place a warm, clean, wet washcloth over the abscess several times a day. Do this as told by your doctor.  Follow instructions from your doctor about how to take care of your abscess. Make sure you:    Cover the abscess with a bandage (dressing).  Change your bandage or gauze as told by your doctor.  Wash your hands with soap and water before you change the bandage or gauze. If you cannot use soap and water, use hand .    Check your abscess every day for signs that the infection is getting worse. Check for:    More redness, swelling, or pain.  More fluid or blood.  Warmth.  More pus or a bad smell.    Medicines     Image   Take over-the-counter and prescription medicines only as told by your doctor.  If you were prescribed an antibiotic medicine, take it as told by your doctor. Do not stop taking the antibiotic even if you start to feel better.  General instructions     To avoid spreading the infection:    Do not share personal care items, towels, or hot tubs with others.  Avoid making skin-to-skin contact with other people.    Keep all follow-up visits as told by your doctor. This is important.  Contact a doctor if:  You have more redness, swelling, or pain around your abscess.  You have more fluid or blood coming from your abscess.  Your abscess feels warm when you touch it.  You have more pus or a bad smell coming from your abscess.  You have a fever.  Your muscles ache.  You have chills.  You feel sick.  Get help right away if:  You have very bad (severe) pain.  You see red streaks on your skin spreading away from the abscess.

## 2022-12-12 DIAGNOSIS — O99.341 OTHER MENTAL DISORDERS COMPLICATING PREGNANCY, FIRST TRIMESTER: ICD-10-CM

## 2022-12-12 DIAGNOSIS — Z20.822 CONTACT WITH AND (SUSPECTED) EXPOSURE TO COVID-19: ICD-10-CM

## 2022-12-12 DIAGNOSIS — Z67.91 UNSPECIFIED BLOOD TYPE, RH NEGATIVE: ICD-10-CM

## 2022-12-12 DIAGNOSIS — O26.891 OTHER SPECIFIED PREGNANCY RELATED CONDITIONS, FIRST TRIMESTER: ICD-10-CM

## 2022-12-12 DIAGNOSIS — F41.9 ANXIETY DISORDER, UNSPECIFIED: ICD-10-CM

## 2022-12-12 DIAGNOSIS — Z3A.01 LESS THAN 8 WEEKS GESTATION OF PREGNANCY: ICD-10-CM

## 2022-12-12 DIAGNOSIS — Z79.899 OTHER LONG TERM (CURRENT) DRUG THERAPY: ICD-10-CM

## 2022-12-12 DIAGNOSIS — O00.102 LEFT TUBAL PREGNANCY WITHOUT INTRAUTERINE PREGNANCY: ICD-10-CM

## 2022-12-13 LAB
-  AMPICILLIN/SULBACTAM: SIGNIFICANT CHANGE UP
-  CEFAZOLIN: SIGNIFICANT CHANGE UP
-  CLINDAMYCIN: SIGNIFICANT CHANGE UP
-  ERYTHROMYCIN: SIGNIFICANT CHANGE UP
-  GENTAMICIN: SIGNIFICANT CHANGE UP
-  OXACILLIN: SIGNIFICANT CHANGE UP
-  RIFAMPIN: SIGNIFICANT CHANGE UP
-  TETRACYCLINE: SIGNIFICANT CHANGE UP
-  TRIMETHOPRIM/SULFAMETHOXAZOLE: SIGNIFICANT CHANGE UP
-  VANCOMYCIN: SIGNIFICANT CHANGE UP
CULTURE RESULTS: SIGNIFICANT CHANGE UP
METHOD TYPE: SIGNIFICANT CHANGE UP
ORGANISM # SPEC MICROSCOPIC CNT: SIGNIFICANT CHANGE UP
ORGANISM # SPEC MICROSCOPIC CNT: SIGNIFICANT CHANGE UP
SPECIMEN SOURCE: SIGNIFICANT CHANGE UP

## 2022-12-16 LAB
CULTURE RESULTS: SIGNIFICANT CHANGE UP
CULTURE RESULTS: SIGNIFICANT CHANGE UP
SPECIMEN SOURCE: SIGNIFICANT CHANGE UP
SPECIMEN SOURCE: SIGNIFICANT CHANGE UP

## 2023-02-13 NOTE — CONSULT NOTE ADULT - SUBJECTIVE AND OBJECTIVE BOX
HPI:     DIYA IZAGUIRRE is a 38 yo  POD#9 s/p emergent laparoscopic salpingectomy for ruptured left fallopian ectopic pregnancy after IVF. Patient presents to the ED due to noting warmth, redness and pain around incision site and by recommendation of her outpatient GYN doctor. Patient has been endorsing warmth around incision site since yesterday. She note subjective fevers, 2 days ago on Friday. Of note, she had also presented to the ED on  ( POD #3) for abdominal pain. GYN consulted at that time, abdominal exam significant for superficial hematoma, management with tylenol/motrin/oxycodone prn, CT reassuring with no evidence of intra-abdominal abscess.    ED interval history: Patient denied drainage from incision. However spontaneous drainage noted while waiting in hospital bed. Patient describe as "pop" and endorses relief in pain subsequently. Mixed bloody and purulent appearing fluid. Patient denies fevers, chills, lightheadedness, dizziness, CP, SOB, N/V or dysuria.       PAST MEDICAL & SURGICAL HISTORY:  Biliary dyskinesia  History of alcohol use-sober 7 years  Anxiety    History of surgery  bilateral tissue removed from axillas  [place] teeth removed  egg retrieval  Cholecystectomy   left salpingectomy 2/2 ectopic pregnancy, via single site    ObHx:  SABx2, TOPx1, ectopic pregnancy (lt fallopian tube s/p salpingectomy)  GynHx:     no h/o abnormal Paps  denies h/o fibroids, cysts, STIs    SocHx: Denies current toxic habits x3  Allergies: No Known Allergies    Home Medications:  busparone: 2   once a day (at bedtime) (2022 06:47)  Zoloft: 150 milligram(s) orally once a day (2022 06:47)  tylenol/motrin prn      Vital Signs Last 24 Hrs  T(C): 37 (11 Dec 2022 08:45), Max: 37 (11 Dec 2022 08:45)  T(F): 98.6 (11 Dec 2022 08:45), Max: 98.6 (11 Dec 2022 08:45)  HR: 89 (11 Dec 2022 08:45) (89 - 89)  BP: 105/70 (11 Dec 2022 08:45) (105/70 - 105/70)  BP(mean): 82 (11 Dec 2022 08:45) (82 - 82)  RR: 18 (11 Dec 2022 08:45) (18 - 18)  SpO2: 97% (11 Dec 2022 08:45) (97% - 97%)    Parameters below as of 11 Dec 2022 08:45  Patient On (Oxygen Delivery Method): room air         PHYSICAL EXAM:  General: NAD  CHEST/LUNG: Clear to percussion bilaterally  HEART: Regular rate and rhythm  ABDOMEN: Soft, Nontender, Nondistended; Bowel sounds present  Incision: Single infra-umbilical 4 cm incision noted. Erythematous about 1-2 cm circumferentially, induration noted along right half on incision (2cm), No fluctuance noted, No active drainage noted at time of interview, small ( < 1 cm) superficial opening in incision where previous drainage noted  EXTREMITIES:  2+ Peripheral Pulses, No clubbing, cyanosis, or edema    LABS:                        12.7   8.98  )-----------( 288      ( 11 Dec 2022 09:54 )             37.9     12-    138  |  107  |  12  ----------------------------<  94  4.3   |  25  |  0.88    Ca    9.5      11 Dec 2022 09:54    TPro  7.8  /  Alb  3.4  /  TBili  0.5  /  DBili  x   /  AST  9<L>  /  ALT  15  /  AlkPhos  73  12-          RADIOLOGY STUDIES:  < from: CT Abdomen and Pelvis w/ IV Cont (. @ 10:10) >  ACC: [id number] EXAM:  CT ABDOMEN AND PELVIS IC                          PROCEDURE DATE:  2022          INTERPRETATION:  CLINICAL INFORMATION: Postop infection. Rule out deep   abscess.    COMPARISON: CT abdomen pelvis 2022.    CONTRAST/COMPLICATIONS:  IV Contrast: Omnipaque 350  90 cc administered   10 cc discarded  Oral Contrast: NONE  Complications: None reported at time of study completion    PROCEDURE:  CT of the Abdomen and Pelvis was performed.  Sagittal and coronal reformats were performed.    FINDINGS:  LOWER CHEST: Within normal limits.    LIVER: Within normal limits.  BILE DUCTS: Normal caliber.  GALLBLADDER: Cholecystectomy.  SPLEEN: Within normal limits.  PANCREAS: Within normal limits.  ADRENALS: Within normal limits.  KIDNEYS/URETERS: Within normal limits.    BLADDER: Within normal limits.  REPRODUCTIVE ORGANS: Left pelvic surgical clips. Otherwise unremarkable   uterus and adnexal regions.    BOWEL: No bowel obstruction. Appendix is normal.  PERITONEUM: No ascites.  VESSELS: Within normal limits.  RETROPERITONEUM/LYMPH NODES: No lymphadenopathy.  ABDOMINAL WALL: Organized complex fluid density rim-enhancing collection   at level of umbilicus 5.2 x 3.0 x 3.2 cm., with localized surrounding fat   infiltration and skin thickening.  BONES: Within normal limits.    IMPRESSION:  New umbilical abscess with surrounding cellulitis.  No abscess in the abdominal cavity.      --- End of Report ---            LARISSA MUNIZ MD; Attending Radiologist  This document has been electronically signed. Dec 11 2022 11:12AM    < end of copied text >    
Attending Only

## 2023-04-11 ENCOUNTER — EMERGENCY (EMERGENCY)
Facility: HOSPITAL | Age: 38
LOS: 0 days | Discharge: HOME | End: 2023-04-11
Attending: EMERGENCY MEDICINE
Payer: COMMERCIAL

## 2023-04-11 ENCOUNTER — APPOINTMENT (OUTPATIENT)
Dept: FAMILY MEDICINE | Facility: CLINIC | Age: 38
End: 2023-04-11

## 2023-04-11 VITALS
DIASTOLIC BLOOD PRESSURE: 79 MMHG | TEMPERATURE: 99 F | OXYGEN SATURATION: 99 % | RESPIRATION RATE: 16 BRPM | SYSTOLIC BLOOD PRESSURE: 121 MMHG | HEART RATE: 78 BPM

## 2023-04-11 VITALS — WEIGHT: 173.06 LBS

## 2023-04-11 DIAGNOSIS — Z98.890 OTHER SPECIFIED POSTPROCEDURAL STATES: Chronic | ICD-10-CM

## 2023-04-11 DIAGNOSIS — K08.409 PARTIAL LOSS OF TEETH, UNSPECIFIED CAUSE, UNSPECIFIED CLASS: Chronic | ICD-10-CM

## 2023-04-11 LAB
ALBUMIN SERPL ELPH-MCNC: 4.4 G/DL — SIGNIFICANT CHANGE UP (ref 3.3–5)
ALP SERPL-CCNC: 54 U/L — SIGNIFICANT CHANGE UP (ref 40–120)
ALT FLD-CCNC: 18 U/L — SIGNIFICANT CHANGE UP (ref 12–78)
ANION GAP SERPL CALC-SCNC: 7 MMOL/L — SIGNIFICANT CHANGE UP (ref 5–17)
AST SERPL-CCNC: 15 U/L — SIGNIFICANT CHANGE UP (ref 15–37)
BASOPHILS # BLD AUTO: 0.04 K/UL — SIGNIFICANT CHANGE UP (ref 0–0.2)
BASOPHILS NFR BLD AUTO: 0.4 % — SIGNIFICANT CHANGE UP (ref 0–2)
BILIRUB SERPL-MCNC: 0.8 MG/DL — SIGNIFICANT CHANGE UP (ref 0.2–1.2)
BUN SERPL-MCNC: 14 MG/DL — SIGNIFICANT CHANGE UP (ref 7–23)
CALCIUM SERPL-MCNC: 10 MG/DL — SIGNIFICANT CHANGE UP (ref 8.5–10.1)
CHLORIDE SERPL-SCNC: 106 MMOL/L — SIGNIFICANT CHANGE UP (ref 96–108)
CO2 SERPL-SCNC: 24 MMOL/L — SIGNIFICANT CHANGE UP (ref 22–31)
CREAT SERPL-MCNC: 0.89 MG/DL — SIGNIFICANT CHANGE UP (ref 0.5–1.3)
EGFR: 86 ML/MIN/1.73M2 — SIGNIFICANT CHANGE UP
EOSINOPHIL # BLD AUTO: 0.01 K/UL — SIGNIFICANT CHANGE UP (ref 0–0.5)
EOSINOPHIL NFR BLD AUTO: 0.1 % — SIGNIFICANT CHANGE UP (ref 0–6)
GLUCOSE SERPL-MCNC: 81 MG/DL — SIGNIFICANT CHANGE UP (ref 70–99)
HCG SERPL-ACNC: <1 MIU/ML — SIGNIFICANT CHANGE UP
HCT VFR BLD CALC: 41.9 % — SIGNIFICANT CHANGE UP (ref 34.5–45)
HGB BLD-MCNC: 14.5 G/DL — SIGNIFICANT CHANGE UP (ref 11.5–15.5)
IMM GRANULOCYTES NFR BLD AUTO: 0.6 % — SIGNIFICANT CHANGE UP (ref 0–0.9)
LIDOCAIN IGE QN: 103 U/L — SIGNIFICANT CHANGE UP (ref 73–393)
LYMPHOCYTES # BLD AUTO: 1.41 K/UL — SIGNIFICANT CHANGE UP (ref 1–3.3)
LYMPHOCYTES # BLD AUTO: 14.9 % — SIGNIFICANT CHANGE UP (ref 13–44)
MCHC RBC-ENTMCNC: 29.8 PG — SIGNIFICANT CHANGE UP (ref 27–34)
MCHC RBC-ENTMCNC: 34.6 GM/DL — SIGNIFICANT CHANGE UP (ref 32–36)
MCV RBC AUTO: 86 FL — SIGNIFICANT CHANGE UP (ref 80–100)
MONOCYTES # BLD AUTO: 0.67 K/UL — SIGNIFICANT CHANGE UP (ref 0–0.9)
MONOCYTES NFR BLD AUTO: 7.1 % — SIGNIFICANT CHANGE UP (ref 2–14)
NEUTROPHILS # BLD AUTO: 7.29 K/UL — SIGNIFICANT CHANGE UP (ref 1.8–7.4)
NEUTROPHILS NFR BLD AUTO: 76.9 % — SIGNIFICANT CHANGE UP (ref 43–77)
PLATELET # BLD AUTO: 291 K/UL — SIGNIFICANT CHANGE UP (ref 150–400)
POTASSIUM SERPL-MCNC: 3.8 MMOL/L — SIGNIFICANT CHANGE UP (ref 3.5–5.3)
POTASSIUM SERPL-SCNC: 3.8 MMOL/L — SIGNIFICANT CHANGE UP (ref 3.5–5.3)
PROT SERPL-MCNC: 8.4 GM/DL — HIGH (ref 6–8.3)
RBC # BLD: 4.87 M/UL — SIGNIFICANT CHANGE UP (ref 3.8–5.2)
RBC # FLD: 11.6 % — SIGNIFICANT CHANGE UP (ref 10.3–14.5)
SODIUM SERPL-SCNC: 137 MMOL/L — SIGNIFICANT CHANGE UP (ref 135–145)
WBC # BLD: 9.48 K/UL — SIGNIFICANT CHANGE UP (ref 3.8–10.5)
WBC # FLD AUTO: 9.48 K/UL — SIGNIFICANT CHANGE UP (ref 3.8–10.5)

## 2023-04-11 PROCEDURE — 99284 EMERGENCY DEPT VISIT MOD MDM: CPT

## 2023-04-11 PROCEDURE — 85025 COMPLETE CBC W/AUTO DIFF WBC: CPT

## 2023-04-11 PROCEDURE — 83690 ASSAY OF LIPASE: CPT

## 2023-04-11 PROCEDURE — 96375 TX/PRO/DX INJ NEW DRUG ADDON: CPT

## 2023-04-11 PROCEDURE — 80053 COMPREHEN METABOLIC PANEL: CPT

## 2023-04-11 PROCEDURE — 99284 EMERGENCY DEPT VISIT MOD MDM: CPT | Mod: 25

## 2023-04-11 PROCEDURE — 36415 COLL VENOUS BLD VENIPUNCTURE: CPT

## 2023-04-11 PROCEDURE — 96374 THER/PROPH/DIAG INJ IV PUSH: CPT

## 2023-04-11 PROCEDURE — 84702 CHORIONIC GONADOTROPIN TEST: CPT

## 2023-04-11 RX ORDER — SODIUM CHLORIDE 9 MG/ML
1000 INJECTION INTRAMUSCULAR; INTRAVENOUS; SUBCUTANEOUS ONCE
Refills: 0 | Status: COMPLETED | OUTPATIENT
Start: 2023-04-11 | End: 2023-04-11

## 2023-04-11 RX ORDER — ONDANSETRON 8 MG/1
4 TABLET, FILM COATED ORAL ONCE
Refills: 0 | Status: COMPLETED | OUTPATIENT
Start: 2023-04-11 | End: 2023-04-11

## 2023-04-11 RX ORDER — ONDANSETRON 8 MG/1
1 TABLET, FILM COATED ORAL
Qty: 30 | Refills: 0
Start: 2023-04-11

## 2023-04-11 RX ADMIN — ONDANSETRON 4 MILLIGRAM(S): 8 TABLET, FILM COATED ORAL at 16:37

## 2023-04-11 RX ADMIN — ONDANSETRON 4 MILLIGRAM(S): 8 TABLET, FILM COATED ORAL at 17:47

## 2023-04-11 RX ADMIN — SODIUM CHLORIDE 1000 MILLILITER(S): 9 INJECTION INTRAMUSCULAR; INTRAVENOUS; SUBCUTANEOUS at 16:38

## 2023-04-11 NOTE — ED PROVIDER NOTE - NSFOLLOWUPINSTRUCTIONS_ED_ALL_ED_FT
Viral Gastroenteritis, Adult  Body outline showing the digestive tract, including the stomach, small intestine, and large intestine.  Viral gastroenteritis is also known as the stomach flu. This condition may affect your stomach, your small intestine, and your large intestine. It can cause sudden watery poop (diarrhea), fever, and vomiting. This condition is caused by certain germs (viruses). These germs can be passed from person to person very easily (are contagious).    Having watery poop and vomiting can make you feel weak and cause you to not have enough water in your body (get dehydrated). This can make you tired and thirsty, make you have a dry mouth, and make it so you pee (urinate) less often. It is important to replace the fluids that you lose from having watery poop and vomiting.    What are the causes?  You can get sick by catching germs from other people.  You can also get sick by:  Eating food, drinking water, or touching a surface that has the germs on it (is contaminated).  Sharing utensils or other personal items with a person who is sick.  What increases the risk?  Having a weak body defense system (immune system).  Living with one or more children who are younger than 2 years.  Living in a nursing home.  Going on cruise ships.  What are the signs or symptoms?  Symptoms of this condition start suddenly. Symptoms may last for a few days or for as long as a week.  Common symptoms include:  Watery poop.  Vomiting.  Other symptoms include:  Fever.  Headache.  Feeling tired (fatigue).  Pain in the belly (abdomen).  Chills.  Feeling weak.  Feeling like you may vomit (nauseous).  Muscle aches.  Not feeling hungry.  How is this treated?  This condition typically goes away on its own. The focus of treatment is to replace the fluids that you lose. This condition may be treated with:  An ORS (oral rehydration solution). This is a drink that helps you replace fluids and minerals your body lost. It is sold at pharmacies and stores.  Medicines to help with your symptoms.  Probiotic supplements to reduce symptoms of watery poop.  Fluids given through an IV tube, if needed.  Older adults and people with other diseases or a weak body defense system are at higher risk for not having enough water in the body.    Follow these instructions at home:  Eating and drinking    Three cups showing dark yellow, yellow, and pale yellow pee.  Take an ORS as told by your doctor.  Drink clear fluids in small amounts as you are able. Clear fluids include:  Water.  Ice chips.  Fruit juice that has water added to it (is diluted).  Low-calorie sports drinks.  Drink enough fluid to keep your pee (urine) pale yellow.  Eat small amounts of healthy foods every 3–4 hours as you are able. This may include whole grains, fruits, vegetables, lean meats, and yogurt.  Avoid fluids that have a lot of sugar or caffeine in them. This includes energy drinks, sports drinks, and soda.  Avoid spicy or fatty foods.  Avoid alcohol.  General instructions    Washing hands with soap and water.  Wash your hands often. This is very important after you have watery poop or you vomit. If you cannot use soap and water, use hand .  Make sure that all people in your home wash their hands well and often.  Take over-the-counter and prescription medicines only as told by your doctor.  Rest at home while you get better.  Watch your condition for any changes.  Take a warm bath to help with any burning or pain from having watery poop.  Keep all follow-up visits.  Contact a doctor if:  You cannot keep fluids down.  Your symptoms get worse.  You have new symptoms.  You feel light-headed or dizzy.  You have muscle cramps.  Get help right away if:  You have chest pain.  You have trouble breathing, or you are breathing very fast.  You have a fast heartbeat.  You feel very weak or you faint.  You have a very bad headache, a stiff neck, or both.  You have a rash.  You have very bad pain, cramping, or bloating in your belly.  Your skin feels cold and clammy.  You feel mixed up (confused).  You have pain when you pee.  You have signs of not having enough water in the body, such as:  Dark pee, hardly any pee, or no pee.  Cracked lips.  Dry mouth.  Sunken eyes.  Feeling very sleepy.  Feeling weak.  You have signs of bleeding, such as:  You see blood in your vomit.  Your vomit looks like coffee grounds.  You have bloody or black poop or poop that looks like tar.  These symptoms may be an emergency. Get help right away. Call 911.  Do not wait to see if the symptoms will go away.  Do not drive yourself to the hospital.  Summary  Viral gastroenteritis is also known as the stomach flu.  This condition can cause sudden watery poop (diarrhea), fever, and vomiting.  These germs can be passed from person to person very easily.  Take an ORS (oral rehydration solution) as told by your doctor. This is a drink that is sold at pharmacies and stores.  Wash your hands often, especially after having watery poop or vomiting. If you cannot use soap and water, use hand .  This information is not intended to replace advice given to you by your health care provider. Make sure you discuss any questions you have with your health care provider.

## 2023-04-11 NOTE — ED PROVIDER NOTE - CARE PROVIDER_API CALL
Germain Adams)  Gastroenterology; Internal Medicine  [address] [place] [address] [place] [address]  Phone: [phone]  Fax: [phone]  Follow Up Time: Urgent

## 2023-04-11 NOTE — ED ADULT NURSE NOTE - NSICDXPASTSURGICALHX_GEN_ALL_CORE_FT
PAST SURGICAL HISTORY:  History of surgery bilateral tissue removed from axillas    History of surgery egg retrieval    [place] teeth removed

## 2023-04-11 NOTE — ED ADULT NURSE NOTE - OBJECTIVE STATEMENT
39 y/o alert female presents to the ED with c/o of n/v. pt reports sx x1day, reports pain in the RLQ and LLQ, describes pain as "crampings, Rates pian 4/10, denies pain meds PTA. LMP 3/21/23

## 2023-04-11 NOTE — ED ADULT TRIAGE NOTE - CHIEF COMPLAINT QUOTE
c/o nausea and vomiting since yesterday. diarrhea (3 episodes today) no sick contact. unable to tolerate PO intake.

## 2023-04-11 NOTE — ED PROVIDER NOTE - OBJECTIVE STATEMENT
39 y/o female with a PMHx of alcohol abuse, anxiety on buspirone, biliary dyskinesia presents to the ED c/o n/v/d x 2 days, diarrhea started today. currently endorses abdominal pain. pt is on Zofran. notes recent surgery of salpingectomy 12/23 and cholecystectomy 03/2023. pt recently travelled to [place] and returned on 4/8 and states last menstruation was 3 weeks ago.   PCP: Dr. Ochoa

## 2023-04-11 NOTE — ED PROVIDER NOTE - PATIENT PORTAL LINK FT
You can access the FollowMyHealth Patient Portal offered by [facility] by registering at the following website: [email or web address]. By joining [facility]’s FollowMyHealth portal, you will also be able to view your health information using other applications (apps) compatible with our system.

## 2023-04-11 NOTE — ED PROVIDER NOTE - NSICDXPASTSURGICALHX_GEN_ALL_CORE_FT
PAST SURGICAL HISTORY:  History of surgery bilateral tissue removed from axillas    History of surgery egg retrieval    [place] teeth removed

## 2023-04-11 NOTE — ED PROVIDER NOTE - PHYSICAL EXAMINATION
PA NOTE: GEN: AOX3, NAD. HEENT: Throat clear. Airway is patent. EYES: PERRLA. EOMI. Head: NC/AT. NECK: Supple, No JVD. FROM. C-spine non-tender. CV:S1S2, RRR, LUNGS: Non-labored breathing, no tachypnea. O2sat 100% RA. CTA b/l. No w/r/r. CHEST: Equal chest expansion and rise. No deformity. ABD: Soft, NT/ND, no rebound, no guarding. No CVAT. EXT: No e/c/c. 2+ distal pulses. SKIN: No rashes. NEURO: No focal deficits. CN II-XII intact. FROM. 5/5 motor and sensory. ~Segundo Bhakta PA-C

## 2023-04-11 NOTE — ED PROVIDER NOTE - ATTENDING APP SHARED VISIT CONTRIBUTION OF CARE
I,Saroj Harvey MD,  performed the initial face to face bedside interview with this patient regarding history of present illness, review of symptoms and relevant past medical, social and family history.  I completed an independent physical examination.  I was the initial provider who evaluated this patient. I have signed out the follow up of any pending tests (i.e. labs, radiological studies) to the ACP.  I have communicated the patient’s plan of care and disposition with the ACP.  The history, relevant review of systems, past medical and surgical history, medical decision making, and physical examination was documented by the scribe in my presence and I attest to the accuracy of the documentation.

## 2023-04-11 NOTE — ED PROVIDER NOTE - PROGRESS NOTE DETAILS
PA: Patient is a 39 y/o female with a PMHx of alcohol abuse, anxiety on buspirone, biliary dyskinesia who presents to ED c/o abd pain, n/v/d x 2 days, diarrhea started today. ~Segundo Bhakta PA-C PA note: All labwork/radiology results discussed in detail with patient. Patient re-examined and re-evaluated. Patient feels much better at this time. ED evaluation, Diagnosis and management discussed with the patient in detail. Workup results discussed with ED attending, OK to [place] home. Close GI MD follow up encouraged, aftercare to assist with scheduling appointment ASAP. Strict ED return instructions discussed in detail and patient given the opportunity to ask any questions about their discharge diagnosis and instructions. Patient verbalized understanding. ~ Segundo Bhakta PA-C

## 2023-04-11 NOTE — ED PROVIDER NOTE - CLINICAL SUMMARY MEDICAL DECISION MAKING FREE TEXT BOX
39 y/o F with 2 days of n/v and today with diarrhea. abdomen is nontender. will check labs, IV fluid, Zofran, and revaluate 39 y/o F with 2 days of n/v and today with diarrhea. abdomen is nontender. will check labs, IV fluid, Zofran, and revaluate    PA note: All labwork/radiology results discussed in detail with patient. Patient re-examined and re-evaluated. Patient feels much better at this time. ED evaluation, Diagnosis and management discussed with the patient in detail. Workup results discussed with ED attending, OK to dc home. Close GI MD follow up encouraged, aftercare to assist with scheduling appointment ASAP. Strict ED return instructions discussed in detail and patient given the opportunity to ask any questions about their discharge diagnosis and instructions. Patient verbalized understanding. ~ Segundo Bhakta PA-C

## 2023-04-12 DIAGNOSIS — R11.2 NAUSEA WITH VOMITING, UNSPECIFIED: ICD-10-CM

## 2023-04-12 DIAGNOSIS — A08.4 VIRAL INTESTINAL INFECTION, UNSPECIFIED: ICD-10-CM

## 2023-04-12 DIAGNOSIS — Z88.2 ALLERGY STATUS TO SULFONAMIDES: ICD-10-CM

## 2023-04-12 DIAGNOSIS — F41.9 ANXIETY DISORDER, UNSPECIFIED: ICD-10-CM

## 2023-04-12 DIAGNOSIS — Z98.890 OTHER SPECIFIED POSTPROCEDURAL STATES: ICD-10-CM

## 2023-04-19 ENCOUNTER — APPOINTMENT (OUTPATIENT)
Dept: BARIATRICS/WEIGHT MGMT | Facility: CLINIC | Age: 38
End: 2023-04-19
Payer: COMMERCIAL

## 2023-04-19 VITALS — HEIGHT: 68 IN | WEIGHT: 168 LBS | BODY MASS INDEX: 25.46 KG/M2

## 2023-04-19 DIAGNOSIS — Z86.2 PERSONAL HISTORY OF DISEASES OF THE BLOOD AND BLOOD-FORMING ORGANS AND CERTAIN DISORDERS INVOLVING THE IMMUNE MECHANISM: ICD-10-CM

## 2023-04-19 DIAGNOSIS — K82.8 OTHER SPECIFIED DISEASES OF GALLBLADDER: ICD-10-CM

## 2023-04-19 PROCEDURE — 99214 OFFICE O/P EST MOD 30 MIN: CPT | Mod: 95

## 2023-04-19 RX ORDER — SUCRALFATE 1 G/10ML
1 SUSPENSION ORAL
Qty: 400 | Refills: 0 | Status: DISCONTINUED | COMMUNITY
Start: 2022-03-03 | End: 2023-04-19

## 2023-04-19 RX ORDER — METHYLPHENIDATE HYDROCHLORIDE 36 MG/1
36 TABLET, EXTENDED RELEASE ORAL DAILY
Refills: 0 | Status: ACTIVE | COMMUNITY
Start: 2023-04-19

## 2023-04-19 RX ORDER — GABAPENTIN 300 MG/1
300 CAPSULE ORAL
Refills: 0 | Status: ACTIVE | COMMUNITY
Start: 2023-04-19

## 2023-04-19 RX ORDER — PANTOPRAZOLE 40 MG/1
40 TABLET, DELAYED RELEASE ORAL DAILY
Qty: 30 | Refills: 3 | Status: DISCONTINUED | COMMUNITY
Start: 2022-03-03 | End: 2023-04-19

## 2023-04-19 RX ORDER — BUSPIRONE HYDROCHLORIDE 15 MG/1
15 TABLET ORAL
Refills: 0 | Status: ACTIVE | COMMUNITY
Start: 2023-04-19

## 2023-04-19 RX ORDER — BENZONATATE 200 MG/1
200 CAPSULE ORAL 3 TIMES DAILY
Qty: 30 | Refills: 0 | Status: DISCONTINUED | COMMUNITY
Start: 2022-01-04 | End: 2023-04-19

## 2023-04-19 NOTE — HISTORY OF PRESENT ILLNESS
[FreeTextEntry1] : Bariatric surgery history: none\par Obesity co- morbidities: anemia, mild psoriasis, add, anxiety\par Comorbidities improved or resolved: none\par Anti-obesity medications:none\par Obesity medication side effects: none\par \par PATIENT WAS NOTIFIED THAT ANYTHING WE DISCUSSED IN OUR MEETING TODAY MAY BE REFLECTED IN WRITING IN THE VISIT NOTE WHICH WILL BE AVAILABLE TO OTHER MEDICAL PROVIDERS TO REVIEW AS PART OF ROUTINE PATIENT CARE.  PATIENT VERBALLY AGREED. \par \par Ms. DIYA IZAGUIRRE is a 38 year year old female who presents for evaluation and treatment of overweight BMI. \par \par Obesity related co- morbidities: anemia, psoriasis, add, anxiety\par s/p cholecystectomy\par alcohol misuse d/o in recovery since 5/8/2015\par buspirone, zoloft daily.  gabapentin - PRN for anxiety.  Sees psychiatrist, and therapy weekly.  \par trazodone - PRN for sleep\par \par Labs labs >2 years ago.\par \par Patient lives - \par Employment status - TA/nursing student\par \par Weight History:\par Lowest adult weight: 159\par Highest adult weight: 225\par \par Has lost 10 pounds over the past year.\par \par Obesity began in young adult.  Weight gain has occurred with: college - freshman 15lb, with alcohol misuse, weight increased with overeating, and with sobriety had decreased overall and with healthy eating habits and exercising down to 159 when she was in [place].  some weight gain 30- 40# with covid/marriage and now slightly decreased.  For past year and a half, eating and running, with some episodes of increased hunger - once a week. \par \par Past weight loss attempts include:  self-dieting - stopped eating junk food in house, WW, exercising - run/walk/boxing/peloton/certified  herself 2017, working at a gym. These have produced a maximum of 5-6 pounds of weight loss.  \par Anti-obesity medications in the past: metformin - didn't help with weight loss\par \par Sleep: 6 hours.  sleeps well.   \par \par Has 3 regular meals a day. \par \par Diet history:\par wakes up at: 6A\par B: 9A - overnight oats w berries, greek yogurt w protein powder w nuts/berries.  Or egg+2 egg whites add hot sauce\par L: 11A or 2P after work - meal prep - roasted butternut squash, brussel sprouts, craisins, and protein - chicken/ground turkey, w balsamic vinegar.  generally feels well satisfied\par snack - nuts 100 aisha or protein bar or fruit.  \par D: 530-8pm but tries to eat earlier, turkey burger, chicken/turkey meatloaf, always a green, asparagus/brussel sprouts, with sometimes brown rice, protein pasta, whole wheat pasta, sometimes potatoes. \par Some days - feel very hungry - early evening 3-5.  Even on weekends.  once a week.  salty/savory - pretzels, popcorn or chocolate.\par \par snacks: afternoon/evening, chocolate\par eating after dinner:  no\par overeating episodes: yes\par \par Sodas/fast food/processed foods: 2 times a month\par \par Water intake per day: 64 oz\par coffee 1-2 cups.  almond milk, w splenda.  \par tea sometimes\par soda - diet soda - on occasion, 1-2 a week\par \par Physical activity:\par Patient enjoys:  boxing/weight training, Peloton, road biking\par Current physical activity: 6 days a week 30-60 min or boxing/weight training, peloton, road biking.  cardio - all 6 days. \par Uses home gym

## 2023-04-19 NOTE — REASON FOR VISIT
[Home] : at home, [unfilled] , at the time of the visit. [Medical Office: ([facility])___] : at the medical office located in  [Initial Evaluation] : an initial evaluation [Patient] : the patient

## 2023-04-19 NOTE — ASSESSMENT
[FreeTextEntry1] : 38 y.o F with overweight BMI,  anemia, psoriasis, add, anxiety, presents for weight management\par \par - order labs\par - patient wants to see if she can get Wegovy, previous bmi 28 with psoriasis, binge eating episodes\par - patient has meatless days and minimizes dairy\par - will email handouts\par - sees psych and weekly therapist for anxiety\par - could consider seeing Dr. Rodríguez for overeating episodes - binge eating mild 1time/week\par \par f/u 4-6 weeks

## 2023-04-26 ENCOUNTER — TRANSCRIPTION ENCOUNTER (OUTPATIENT)
Age: 38
End: 2023-04-26

## 2023-05-03 NOTE — ED ADULT TRIAGE NOTE - WEIGHT IN KG
Pt discharged at approx 1400. his right groin venous access site dressing was
clean, dry, and intact at time of discharge. he was assisted to the main lobby
via wheelchair where his wife was waiting to drive him home. pt's right
arterial access point was dressed with a bandage and sterile gauze. no
bleeding was occuring at the site at time of discharge. pt verbalized
understanding of discharge instructions and he tolerated po food and fluid
during post-op period. pt was free from concerns and complaints at time of
discharge. 78.5

## 2023-05-15 ENCOUNTER — TRANSCRIPTION ENCOUNTER (OUTPATIENT)
Age: 38
End: 2023-05-15

## 2023-06-02 NOTE — ED STATDOCS - NS ED SCRIBE STATEMENT

## 2023-06-06 ENCOUNTER — TRANSCRIPTION ENCOUNTER (OUTPATIENT)
Age: 38
End: 2023-06-06

## 2023-06-07 ENCOUNTER — APPOINTMENT (OUTPATIENT)
Dept: INTERNAL MEDICINE | Facility: CLINIC | Age: 38
End: 2023-06-07
Payer: COMMERCIAL

## 2023-06-07 VITALS
HEART RATE: 85 BPM | OXYGEN SATURATION: 99 % | HEIGHT: 68 IN | TEMPERATURE: 98.2 F | SYSTOLIC BLOOD PRESSURE: 102 MMHG | DIASTOLIC BLOOD PRESSURE: 60 MMHG | BODY MASS INDEX: 26.37 KG/M2 | WEIGHT: 174 LBS

## 2023-06-07 DIAGNOSIS — E83.52 HYPERCALCEMIA: ICD-10-CM

## 2023-06-07 DIAGNOSIS — Z00.00 ENCOUNTER FOR GENERAL ADULT MEDICAL EXAMINATION W/OUT ABNORMAL FINDINGS: ICD-10-CM

## 2023-06-07 DIAGNOSIS — D35.2 BENIGN NEOPLASM OF PITUITARY GLAND: ICD-10-CM

## 2023-06-07 PROCEDURE — 90472 IMMUNIZATION ADMIN EACH ADD: CPT

## 2023-06-07 PROCEDURE — 90715 TDAP VACCINE 7 YRS/> IM: CPT

## 2023-06-07 PROCEDURE — 90471 IMMUNIZATION ADMIN: CPT

## 2023-06-07 PROCEDURE — 90707 MMR VACCINE SC: CPT

## 2023-06-07 PROCEDURE — 99395 PREV VISIT EST AGE 18-39: CPT | Mod: 25

## 2023-06-07 RX ORDER — TRAZODONE HYDROCHLORIDE 50 MG/1
50 TABLET ORAL
Refills: 0 | Status: DISCONTINUED | COMMUNITY
Start: 2023-04-19 | End: 2023-06-07

## 2023-06-08 NOTE — HEALTH RISK ASSESSMENT
[No] : In the past 12 months have you used drugs other than those required for medical reasons? No [0] : 2) Feeling down, depressed, or hopeless: Not at all (0) [Patient reported PAP Smear was normal] : Patient reported PAP Smear was normal [Never] : Never [[id number]] : 0 [EyeExamDate] : 01/2022 [With Family] : lives with family [Graduate School] : graduate school [] :  [Fully functional (bathing, dressing, toileting, transferring, walking, feeding)] : Fully functional (bathing, dressing, toileting, transferring, walking, feeding) [Fully functional (using the telephone, shopping, preparing meals, housekeeping, doing laundry, using] : Fully functional and needs no help or supervision to perform IADLs (using the telephone, shopping, preparing meals, housekeeping, doing laundry, using transportation, managing medications and managing finances) [PapSmearDate] : 10/2020

## 2023-06-08 NOTE — HISTORY OF PRESENT ILLNESS
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 \par Patient did establish with obesity medicine and has been on Wegovy and has lost over 20 pounds.  She feels well with no side effects.\par She has followed up with endocrinology  in the past regarding her pituitary microadenoma and as her prolactin levels were within normal limits she is no longer on cabergoline.\par She follows with mental health as well and continued on Concerta, gabapentin, Zoloft, buspirone and taking Valium for this month.\par Patient denies any cp, sob,abdominal pain, nausea, vomiting, palpitations, fever, chills, constipation, diarrhea.\par

## 2023-06-08 NOTE — ASSESSMENT
[FreeTextEntry1] : 1.anxiety disorder: Prior history of substance abuse.  Continue on Zoloft 150 mg, gabapentin 300 mg as needed, Concerta 32 mg daily, buspirone 15 mg and Valium 5 mg daily.  Continue following up with mental health and therapist.\par \par 2.health maintenance: Follow-up with GYN for Pap smear, discussed blood work to obtain for her forms.  Recent blood work done with GYN reviewed.\par Tdap and MMR vaccine given today. Went over side effects of vaccine.  She will return for hepatitis B vaccine.  We will call once forms are complete.\par \par 3.history of obesity: Doing well on Wegovy, follow-up with obesity medicine.\par \par \par

## 2023-06-12 ENCOUNTER — APPOINTMENT (OUTPATIENT)
Dept: BARIATRICS/WEIGHT MGMT | Facility: CLINIC | Age: 38
End: 2023-06-12

## 2023-06-16 ENCOUNTER — APPOINTMENT (OUTPATIENT)
Dept: INTERNAL MEDICINE | Facility: CLINIC | Age: 38
End: 2023-06-16
Payer: COMMERCIAL

## 2023-06-16 LAB
CALCIUM SERPL-MCNC: 10.1 MG/DL
M TB IFN-G BLD-IMP: NEGATIVE
QUANTIFERON TB PLUS MITOGEN MINUS NIL: 4.19 IU/ML
QUANTIFERON TB PLUS NIL: 0.01 IU/ML
QUANTIFERON TB PLUS TB1 MINUS NIL: 0 IU/ML
QUANTIFERON TB PLUS TB2 MINUS NIL: 0.01 IU/ML

## 2023-06-16 PROCEDURE — 90746 HEPB VACCINE 3 DOSE ADULT IM: CPT

## 2023-06-16 PROCEDURE — 90471 IMMUNIZATION ADMIN: CPT

## 2023-06-23 ENCOUNTER — TRANSCRIPTION ENCOUNTER (OUTPATIENT)
Age: 38
End: 2023-06-23

## 2023-06-26 ENCOUNTER — TRANSCRIPTION ENCOUNTER (OUTPATIENT)
Age: 38
End: 2023-06-26

## 2023-06-30 ENCOUNTER — TRANSCRIPTION ENCOUNTER (OUTPATIENT)
Age: 38
End: 2023-06-30

## 2023-07-17 ENCOUNTER — APPOINTMENT (OUTPATIENT)
Dept: INTERNAL MEDICINE | Facility: CLINIC | Age: 38
End: 2023-07-17
Payer: COMMERCIAL

## 2023-07-17 PROCEDURE — 90746 HEPB VACCINE 3 DOSE ADULT IM: CPT

## 2023-07-17 PROCEDURE — 90471 IMMUNIZATION ADMIN: CPT

## 2023-07-19 ENCOUNTER — TRANSCRIPTION ENCOUNTER (OUTPATIENT)
Age: 38
End: 2023-07-19

## 2023-08-08 NOTE — ED ADULT NURSE NOTE - OBJECTIVE STATEMENT
patient presenting via wheelchair to ED, , 6 weeks pregnant sent in by IVF MD, c/o severe abdominal pain and vaginal bleeding. patient states she's going through approx 1 pad an hour. patient with confirmed ectopic pregnancy via US today. OBGYN dr. lynch.
RISA Lamb

## 2023-08-22 ENCOUNTER — TRANSCRIPTION ENCOUNTER (OUTPATIENT)
Age: 38
End: 2023-08-22

## 2023-08-29 ENCOUNTER — APPOINTMENT (OUTPATIENT)
Dept: BARIATRICS/WEIGHT MGMT | Facility: CLINIC | Age: 38
End: 2023-08-29
Payer: COMMERCIAL

## 2023-08-29 VITALS — WEIGHT: 159 LBS | BODY MASS INDEX: 24.18 KG/M2

## 2023-08-29 DIAGNOSIS — Z13.220 ENCOUNTER FOR SCREENING FOR LIPOID DISORDERS: ICD-10-CM

## 2023-08-29 DIAGNOSIS — R10.13 EPIGASTRIC PAIN: ICD-10-CM

## 2023-08-29 DIAGNOSIS — Z13.21 ENCOUNTER FOR SCREENING FOR NUTRITIONAL DISORDER: ICD-10-CM

## 2023-08-29 DIAGNOSIS — Z13.0 ENCOUNTER FOR SCREENING FOR OTHER SUSPECTED ENDOCRINE DISORDER: ICD-10-CM

## 2023-08-29 DIAGNOSIS — Z13.1 ENCOUNTER FOR SCREENING FOR DIABETES MELLITUS: ICD-10-CM

## 2023-08-29 DIAGNOSIS — Z13.228 ENCOUNTER FOR SCREENING FOR OTHER SUSPECTED ENDOCRINE DISORDER: ICD-10-CM

## 2023-08-29 DIAGNOSIS — Z13.29 ENCOUNTER FOR SCREENING FOR OTHER SUSPECTED ENDOCRINE DISORDER: ICD-10-CM

## 2023-08-29 PROCEDURE — 99214 OFFICE O/P EST MOD 30 MIN: CPT | Mod: 95

## 2023-08-29 NOTE — ASSESSMENT
[FreeTextEntry1] : 38 y.o F with overweight BMI, anemia, psoriasis, add, anxiety, presents for weight management  - doing well w wegovy 1.7mg. continue - previous bmi 28 with psoriasis, binge eating episodes - patient has meatless days and minimizes dairy - sees psych and weekly therapist for anxiety - could consider seeing Dr. Rodríguez for overeating episodes - binge eating mild 1time/week but these days, no binge eating episodes  f/u 3 months

## 2023-08-29 NOTE — REASON FOR VISIT
[Home] : at home, [unfilled] , at the time of the visit. [Medical Office: ([facility])___] : at the medical office located in  [Patient] : the patient [Self] : self [Follow-Up] : a follow-up visit

## 2023-08-29 NOTE — HISTORY OF PRESENT ILLNESS
[FreeTextEntry1] : Bariatric surgery history: none Obesity co- morbidities: anemia, mild psoriasis, add, anxiety Comorbidities improved or resolved: none Anti-obesity medications: Wegovy Obesity medication side effects: none  PATIENT WAS NOTIFIED THAT ANYTHING WE DISCUSSED IN OUR MEETING TODAY MAY BE REFLECTED IN WRITING IN THE VISIT NOTE WHICH WILL BE AVAILABLE TO OTHER MEDICAL PROVIDERS TO REVIEW AS PART OF ROUTINE PATIENT CARE.  PATIENT VERBALLY AGREED.   Ms. DIYA IZAGUIRRE is a 38 year year old female who presents for evaluation and treatment of overweight BMI, now overweight BMI.   Obesity related co- morbidities: anemia, psoriasis, add, anxiety s/p cholecystectomy alcohol misuse d/o in recovery since 5/8/2015 buspirone, zoloft daily.  gabapentin - PRN for anxiety.  Sees psychiatrist, and therapy weekly.   trazodone - PRN for sleep  Patient lives -  Employment status - TA/nursing student  Weight History: Lowest adult weight: 159 Highest adult weight: 225  Interim: - has been taking wegovy 1.7mg, no binge episodes for >4 months.   - feels great about progress, now normal bmi - decr portions as well, 3-4 smaller meals a day - 1.5L water per day - uses [employer] terell, would like recs regarding fiber/protein foods - morning - greek yogurt and berries and granola - lunch - some protein bar, or salad.  no meat.    Obesity began in young adult.  Weight gain has occurred with: college - freshman 15lb, with alcohol misuse, weight increased with overeating, and with sobriety had decreased overall and with healthy eating habits and exercising down to 159 when she was in [place].  some weight gain 30- 40# with covid/marriage and now slightly decreased.  For past year and a half, eating and running, with some episodes of increased hunger - once a week.   Past weight loss attempts include:  self-dieting - stopped eating junk food in house, WW, exercising - run/walk/boxing/peloton/certified  herself 2017, working at a gym. These have produced a maximum of 5-6 pounds of weight loss.   Anti-obesity medications in the past: metformin - didn't help with weight loss  Sleep: 6 hours.  sleeps well.     Has 3 regular meals a day.   Diet history: wakes up at: 6A B: 9A - overnight oats w berries, greek yogurt w protein powder w nuts/berries.  Or egg+2 egg whites add hot sauce L: 11A or 2P after work - meal prep - roasted butternut squash, brussel sprouts, craisins, and protein - chicken/ground turkey, w balsamic vinegar.  generally feels well satisfied snack - nuts 100 aisha or protein bar or fruit.   D: 530-8pm but tries to eat earlier, turkey burger, chicken/turkey meatloaf, always a green, asparagus/brussel sprouts, with sometimes brown rice, protein pasta, whole wheat pasta, sometimes potatoes.  Some days - feel very hungry - early evening 3-5.  Even on weekends.  once a week.  salty/savory - pretzels, popcorn or chocolate.  snacks: afternoon/evening, chocolate eating after dinner:  no overeating episodes: yes  Sodas/fast food/processed foods: 2 times a month  Water intake per day: 64 oz coffee 1-2 cups.  almond milk, w splenda.   tea sometimes soda - diet soda - on occasion, 1-2 a week  Physical activity: Patient enjoys:  boxing/weight training, Peloton, road biking Current physical activity: 6 days a week 30-60 min or boxing/weight training, peloton, road biking.  cardio - all 6 days.  Uses home gym

## 2023-08-30 ENCOUNTER — OUTPATIENT (OUTPATIENT)
Dept: OUTPATIENT SERVICES | Facility: HOSPITAL | Age: 38
LOS: 1 days | End: 2023-08-30
Payer: COMMERCIAL

## 2023-08-30 DIAGNOSIS — K08.409 PARTIAL LOSS OF TEETH, UNSPECIFIED CAUSE, UNSPECIFIED CLASS: Chronic | ICD-10-CM

## 2023-08-30 DIAGNOSIS — Z98.890 OTHER SPECIFIED POSTPROCEDURAL STATES: Chronic | ICD-10-CM

## 2023-08-30 DIAGNOSIS — I10 ESSENTIAL (PRIMARY) HYPERTENSION: ICD-10-CM

## 2023-08-30 PROCEDURE — G0463: CPT

## 2023-09-01 ENCOUNTER — APPOINTMENT (OUTPATIENT)
Dept: INTERNAL MEDICINE | Facility: CLINIC | Age: 38
End: 2023-09-01

## 2023-09-25 ENCOUNTER — TRANSCRIPTION ENCOUNTER (OUTPATIENT)
Age: 38
End: 2023-09-25

## 2023-10-26 ENCOUNTER — TRANSCRIPTION ENCOUNTER (OUTPATIENT)
Age: 38
End: 2023-10-26

## 2023-11-27 ENCOUNTER — OUTPATIENT (OUTPATIENT)
Dept: OUTPATIENT SERVICES | Facility: HOSPITAL | Age: 38
LOS: 1 days | End: 2023-11-27
Payer: COMMERCIAL

## 2023-11-27 ENCOUNTER — APPOINTMENT (OUTPATIENT)
Dept: BARIATRICS/WEIGHT MGMT | Facility: CLINIC | Age: 38
End: 2023-11-27
Payer: COMMERCIAL

## 2023-11-27 VITALS — WEIGHT: 148 LBS | BODY MASS INDEX: 22.5 KG/M2

## 2023-11-27 DIAGNOSIS — I10 ESSENTIAL (PRIMARY) HYPERTENSION: ICD-10-CM

## 2023-11-27 DIAGNOSIS — Z98.890 OTHER SPECIFIED POSTPROCEDURAL STATES: Chronic | ICD-10-CM

## 2023-11-27 DIAGNOSIS — L40.9 PSORIASIS, UNSPECIFIED: ICD-10-CM

## 2023-11-27 DIAGNOSIS — K08.409 PARTIAL LOSS OF TEETH, UNSPECIFIED CAUSE, UNSPECIFIED CLASS: Chronic | ICD-10-CM

## 2023-11-27 PROCEDURE — 99214 OFFICE O/P EST MOD 30 MIN: CPT | Mod: 95

## 2023-11-27 PROCEDURE — G0463: CPT

## 2023-12-04 ENCOUNTER — TRANSCRIPTION ENCOUNTER (OUTPATIENT)
Age: 38
End: 2023-12-04

## 2023-12-05 DIAGNOSIS — R63.2 POLYPHAGIA: ICD-10-CM

## 2023-12-05 DIAGNOSIS — E66.3 OVERWEIGHT: ICD-10-CM

## 2023-12-05 DIAGNOSIS — L40.9 PSORIASIS, UNSPECIFIED: ICD-10-CM

## 2024-01-02 ENCOUNTER — TRANSCRIPTION ENCOUNTER (OUTPATIENT)
Age: 39
End: 2024-01-02

## 2024-01-08 ENCOUNTER — APPOINTMENT (OUTPATIENT)
Dept: BARIATRICS/WEIGHT MGMT | Facility: CLINIC | Age: 39
End: 2024-01-08

## 2024-01-08 NOTE — ED PROVIDER NOTE - IV ALTEPLASE EXCL REL HIDDEN
Patient called to ask if it would be ok to eat a tuna steak.  She is advised as long as she has not eaten a lot of seafood this week, and the tuna is fully cooked this would be ok.  She can have 1-2 servings of seafood per week.  She is pregnant and concerned of mercury consumption.   All questions answered, patient v/u.  
show

## 2024-01-27 NOTE — ED ADULT TRIAGE NOTE - CHIEF COMPLAINT QUOTE
with family member at bedside/Private car
heavy vaginal bleeding x 4 days. Pt c/o dizziness, nausea, chest tightness, headache and bodyaches x 5 days. Denies abd pain, fever/chills.

## 2024-02-05 ENCOUNTER — TRANSCRIPTION ENCOUNTER (OUTPATIENT)
Age: 39
End: 2024-02-05

## 2024-02-05 ENCOUNTER — NON-APPOINTMENT (OUTPATIENT)
Age: 39
End: 2024-02-05

## 2024-02-07 ENCOUNTER — APPOINTMENT (OUTPATIENT)
Dept: BARIATRICS/WEIGHT MGMT | Facility: CLINIC | Age: 39
End: 2024-02-07
Payer: COMMERCIAL

## 2024-02-07 VITALS — WEIGHT: 149 LBS | BODY MASS INDEX: 22.66 KG/M2

## 2024-02-07 DIAGNOSIS — E66.3 OVERWEIGHT: ICD-10-CM

## 2024-02-07 DIAGNOSIS — R63.2 POLYPHAGIA: ICD-10-CM

## 2024-02-07 PROCEDURE — 99214 OFFICE O/P EST MOD 30 MIN: CPT

## 2024-02-07 NOTE — HISTORY OF PRESENT ILLNESS
[FreeTextEntry1] : PATIENT WAS NOTIFIED THAT ANYTHING WE DISCUSSED IN OUR MEETING TODAY MAY BE REFLECTED IN WRITING IN THE VISIT NOTE WHICH WILL BE AVAILABLE TO OTHER MEDICAL PROVIDERS TO REVIEW AS PART OF ROUTINE PATIENT CARE.  PATIENT VERBALLY AGREED.   Ms. DIYA IZAGUIRRE is a 38 year year old female who presents for evaluation and treatment of overweight BMI, now overweight BMI.   Obesity related co- morbidities: anemia, psoriasis, add, anxiety s/p cholecystectomy alcohol misuse d/o in recovery since 5/8/2015 buspirone, zoloft daily.  gabapentin - PRN for anxiety.  Sees psychiatrist, and therapy weekly.   trazodone - PRN for sleep   Interim: - no binging since being on wegovy 1.7mg - today 149 lbs, feels very good  - stable since last visit (max 220lbs) - eats only when she's hungry and tries to eat healthy - in N[employer] school at this time Food: tries to have healthy options available, doesn't like meat. a lot of beans, salad, greek yogurt, protein shakes, berries, veggies Hydration- 32 oz x2 bottle PA- tries to use peloton 3-4x/week, walks her dog, in summer plays golf    Weight History: Lowest adult weight: 159 Highest adult weight: 225

## 2024-02-07 NOTE — ASSESSMENT
[FreeTextEntry1] : Bariatric surgery history: none Obesity co- morbidities: anemia, mild psoriasis, add, anxiety Comorbidities improved or resolved: none Anti-obesity medications: Wegovy Obesity medication side effects: none   38 y.o F with overweight BMI now normal BMI, anemia, psoriasis, add, anxiety, presents for weight management  - doing well w wegovy 1.7mg. continue - does meditation, weekly therapy for stress management - previous bmi 28 with psoriasis, binge eating episodes, now 22  - c/w WF, PB diet, hydration, physical activity- can add 2x/week of resistance training - sees psych and weekly therapist for anxiety   f/u 6-8 weeks

## 2024-02-08 ENCOUNTER — OUTPATIENT (OUTPATIENT)
Dept: OUTPATIENT SERVICES | Facility: HOSPITAL | Age: 39
LOS: 1 days | End: 2024-02-08

## 2024-02-08 DIAGNOSIS — I10 ESSENTIAL (PRIMARY) HYPERTENSION: ICD-10-CM

## 2024-02-08 DIAGNOSIS — Z98.890 OTHER SPECIFIED POSTPROCEDURAL STATES: Chronic | ICD-10-CM

## 2024-02-08 DIAGNOSIS — K08.409 PARTIAL LOSS OF TEETH, UNSPECIFIED CAUSE, UNSPECIFIED CLASS: Chronic | ICD-10-CM

## 2024-03-20 ENCOUNTER — OUTPATIENT (OUTPATIENT)
Dept: OUTPATIENT SERVICES | Facility: HOSPITAL | Age: 39
LOS: 1 days | End: 2024-03-20
Payer: COMMERCIAL

## 2024-03-20 ENCOUNTER — APPOINTMENT (OUTPATIENT)
Dept: BARIATRICS/WEIGHT MGMT | Facility: CLINIC | Age: 39
End: 2024-03-20
Payer: COMMERCIAL

## 2024-03-20 VITALS — WEIGHT: 148 LBS | BODY MASS INDEX: 22.5 KG/M2

## 2024-03-20 DIAGNOSIS — I10 ESSENTIAL (PRIMARY) HYPERTENSION: ICD-10-CM

## 2024-03-20 DIAGNOSIS — Z98.890 OTHER SPECIFIED POSTPROCEDURAL STATES: Chronic | ICD-10-CM

## 2024-03-20 PROCEDURE — G0463: CPT

## 2024-03-20 PROCEDURE — 99214 OFFICE O/P EST MOD 30 MIN: CPT | Mod: 95

## 2024-03-20 RX ORDER — SEMAGLUTIDE 1.7 MG/.75ML
1.7 INJECTION, SOLUTION SUBCUTANEOUS
Qty: 3 | Refills: 1 | Status: ACTIVE | COMMUNITY
Start: 2023-04-19 | End: 1900-01-01

## 2024-03-20 NOTE — ASSESSMENT
[FreeTextEntry1] : Bariatric surgery history: none Obesity co- morbidities: anemia, mild psoriasis, add, anxiety Comorbidities improved or resolved: none Anti-obesity medications: Wegovy Obesity medication side effects: none   38 y.o F with overweight BMI now normal BMI, anemia, psoriasis, add, anxiety, presents for weight management  - doing well w wegovy 1.7mg. continue- will renew today - c/w weekly therapy for stress management - previous bmi 28 with psoriasis, binge eating episodes, now 22  - c/w WF, PB diet, hydration, physical activity- can add 2x/week of resistance training- swap some walking w/ weights   f/u 6-8 weeks

## 2024-03-20 NOTE — HISTORY OF PRESENT ILLNESS
[FreeTextEntry1] : PATIENT WAS NOTIFIED THAT ANYTHING WE DISCUSSED IN OUR MEETING TODAY MAY BE REFLECTED IN WRITING IN THE VISIT NOTE WHICH WILL BE AVAILABLE TO OTHER MEDICAL PROVIDERS TO REVIEW AS PART OF ROUTINE PATIENT CARE.  PATIENT VERBALLY AGREED.   Ms. DIYA IZAGUIRRE is a 38 year year old female who presents for evaluation and treatment of overweight BMI, now overweight BMI.   Obesity related co- morbidities: anemia, psoriasis, add, anxiety s/p cholecystectomy alcohol misuse d/o in recovery since 5/8/2015 buspirone, zoloft daily.  gabapentin - PRN for anxiety.  Sees psychiatrist, and therapy weekly.   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   Weight History: Lowest adult weight: 159 Highest adult weight: 225

## 2024-03-25 DIAGNOSIS — E66.9 OBESITY, UNSPECIFIED: ICD-10-CM

## 2024-03-31 ENCOUNTER — NON-APPOINTMENT (OUTPATIENT)
Age: 39
End: 2024-03-31

## 2024-05-13 ENCOUNTER — MED ADMIN CHARGE (OUTPATIENT)
Age: 39
End: 2024-05-13

## 2024-05-13 ENCOUNTER — APPOINTMENT (OUTPATIENT)
Dept: INTERNAL MEDICINE | Facility: CLINIC | Age: 39
End: 2024-05-13
Payer: COMMERCIAL

## 2024-05-13 DIAGNOSIS — Z11.1 ENCOUNTER FOR SCREENING FOR RESPIRATORY TUBERCULOSIS: ICD-10-CM

## 2024-05-13 DIAGNOSIS — Z23 ENCOUNTER FOR IMMUNIZATION: ICD-10-CM

## 2024-05-13 PROCEDURE — 86580 TB INTRADERMAL TEST: CPT

## 2024-05-14 ENCOUNTER — APPOINTMENT (OUTPATIENT)
Dept: FAMILY MEDICINE | Facility: CLINIC | Age: 39
End: 2024-05-14
Payer: COMMERCIAL

## 2024-05-14 ENCOUNTER — APPOINTMENT (OUTPATIENT)
Dept: BARIATRICS/WEIGHT MGMT | Facility: CLINIC | Age: 39
End: 2024-05-14
Payer: COMMERCIAL

## 2024-05-14 ENCOUNTER — OUTPATIENT (OUTPATIENT)
Dept: OUTPATIENT SERVICES | Facility: HOSPITAL | Age: 39
LOS: 1 days | End: 2024-05-14
Payer: COMMERCIAL

## 2024-05-14 VITALS
HEART RATE: 76 BPM | WEIGHT: 148 LBS | HEIGHT: 68 IN | SYSTOLIC BLOOD PRESSURE: 112 MMHG | TEMPERATURE: 98.1 F | DIASTOLIC BLOOD PRESSURE: 64 MMHG | OXYGEN SATURATION: 98 % | BODY MASS INDEX: 22.43 KG/M2

## 2024-05-14 VITALS — BODY MASS INDEX: 22.5 KG/M2 | WEIGHT: 148 LBS

## 2024-05-14 DIAGNOSIS — R30.0 DYSURIA: ICD-10-CM

## 2024-05-14 DIAGNOSIS — E66.9 OBESITY, UNSPECIFIED: ICD-10-CM

## 2024-05-14 DIAGNOSIS — K08.409 PARTIAL LOSS OF TEETH, UNSPECIFIED CAUSE, UNSPECIFIED CLASS: Chronic | ICD-10-CM

## 2024-05-14 DIAGNOSIS — Z98.890 OTHER SPECIFIED POSTPROCEDURAL STATES: Chronic | ICD-10-CM

## 2024-05-14 DIAGNOSIS — I10 ESSENTIAL (PRIMARY) HYPERTENSION: ICD-10-CM

## 2024-05-14 DIAGNOSIS — R31.29 OTHER MICROSCOPIC HEMATURIA: ICD-10-CM

## 2024-05-14 DIAGNOSIS — F41.9 ANXIETY DISORDER, UNSPECIFIED: ICD-10-CM

## 2024-05-14 LAB
BILIRUB UR QL STRIP: NORMAL
CLARITY UR: CLEAR
COLLECTION METHOD: NORMAL
GLUCOSE UR-MCNC: NORMAL
HCG UR QL: 0.2 EU/DL
HGB UR QL STRIP.AUTO: NORMAL
KETONES UR-MCNC: NORMAL
LEUKOCYTE ESTERASE UR QL STRIP: NORMAL
NITRITE UR QL STRIP: NORMAL
PH UR STRIP: 6
PROT UR STRIP-MCNC: NORMAL
SP GR UR STRIP: 1.01

## 2024-05-14 PROCEDURE — 99214 OFFICE O/P EST MOD 30 MIN: CPT | Mod: 95

## 2024-05-14 PROCEDURE — 99214 OFFICE O/P EST MOD 30 MIN: CPT

## 2024-05-14 PROCEDURE — 81003 URINALYSIS AUTO W/O SCOPE: CPT | Mod: QW

## 2024-05-14 PROCEDURE — G0463: CPT

## 2024-05-14 NOTE — HISTORY OF PRESENT ILLNESS
[FreeTextEntry1] : PATIENT WAS NOTIFIED THAT ANYTHING WE DISCUSSED IN OUR MEETING TODAY MAY BE REFLECTED IN WRITING IN THE VISIT NOTE WHICH WILL BE AVAILABLE TO OTHER MEDICAL PROVIDERS TO REVIEW AS PART OF ROUTINE PATIENT CARE.  PATIENT VERBALLY AGREED.   Ms. DIYA IZAGUIRRE is a 38 year year old female who presents for evaluation and treatment of overweight BMI, now overweight BMI.   Obesity related co- morbidities: anemia, psoriasis, add, anxiety s/p cholecystectomy alcohol misuse d/o in recovery since 5/8/2015 buspirone, zoloft daily.  gabapentin - PRN for anxiety.  Sees psychiatrist, and therapy weekly.   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

## 2024-05-14 NOTE — ASSESSMENT
[FreeTextEntry1] : Bariatric surgery history: none Obesity co- morbidities: anemia, mild psoriasis, add, anxiety Comorbidities improved or resolved: none Anti-obesity medications: Wegovy Obesity medication side effects: none   38 y.o F with overweight BMI now normal BMI, anemia, psoriasis, add, anxiety, presents for weight management  - doing well w wegovy 1.7mg. continue- has one more month and 1 refill, no need to renew today - c/w weekly therapy for stress management - previous bmi 28 with psoriasis, binge eating episodes, now 22  - c/w WF, PB diet, hydration, physical activity, resistance training   f/u 2-3 months

## 2024-05-14 NOTE — HISTORY OF PRESENT ILLNESS
[FreeTextEntry8] : ACute care visit PCP DR Dobbins  Reports having a UTI, diagnosed on 4/13 by Urgent care and treated with Macrobid.  Symptoms did not resolve.  Continued to have urinary burning, frequency and urgency.  She was evaluated at  again and prescribed Keflex.  She finished 7 days abx and still had same symptoms.  She does not feel she is getting better.  Now she is having new lower back pain, right> left.  Denies fever, chills,  She has a remote history of pyelonephritis.

## 2024-05-14 NOTE — REASON FOR VISIT
Wash face twice daily.  Recommend daily SPF of at least 30.    Recommend over the counter Ambi fade cream (hydroquinone 2%) once daily to dark spots on legs.    RETINOIDS           Your doctor has prescribed a topical retinoid for your skin. A retinoid is a vitamin A derived product used to treat a variety of skin conditions including acne, actinic keratoses (pre-skin cancers), uneven pigmentation from sun damage, fine lines and wrinkles, and enlarged pores.    How do they work?         Retinoids increase skin cell turn over from the normal 30 days to five or six days, minimizing clogged pores-the major factor in acne. Retinoids can also repair the DNA in cells damaged by the sun helping to even out skin pigmentation and clear pre-skin cancers. They can shrink oil glands and minimize the appearance of large pores. These effects can not be appreciated unless the medication is used on a consistent basis!    How do I use a retinoid?         After washing with a mild cleanser (Purpose, Aqua glycolic face cleanser, Cetaphil, Neutrogena deep cream cleanser), the skin should be moisturized with a non-retinol containing moisturizer such as Cerave PM. Then a thin layer of medication is applied to the forehead, nose cheeks, and chin (and around eyes if treating fine lines and wrinkles) at night. The amount of medication needed to cover the entire face should be no more than the size of a green pea. Irritation around the eyes can be treated with Vaseline at night.    What if my skin appears dry, red, and is peeling?          Retinoids do not cause dry skin but rather they cause the top layer of the skin the shed, giving an appearance of dry skin. In fact, new healthy skin cells are replacing older, damaged cells on the surface. This usually occurs the first 2-4 weeks as the skin is adjusting to the medication. It is reasonable to use the medication every other night or even every 2 nights until your skin adjusts. You can use a  MILD exfoliant to remove the peeling skin (Aveeno daily clarifying pads, Aqua glycolic face cream) and can apply a moisturizer throughout the day as needed. Retinoids come in a variety of strengths and vehicles and your doctor can find one best for you. If you cannot tolerate prescription strength retinoids, over the counter products with retinol may be beneficial. (Olay ProX wrinkle cream, JOSEPH deep wrinkle cream, Green Cream at [employer])    Will my skin be more sensitive in the sun?           You will need to use sunscreen with SPF 30 daily. Retinoids will cause the outermost layer of the skin to be thinner and thus more sensitive to ultraviolet rays. However, remember that over time, retinoids actually make the skin thicker by enhancing collagen deposition which protects the skin from sun damage.    When will I see results?            If you are using a retinoid for acne, you should see improvement in 6-8 weeks. Do not be alarmed if you find that your acne gets worse before it gets better-KEEP USING THE MEDICATION- this is a normal response and your acne will improve if you can stick with it.           If you are using the medication for anti-aging and skin dyspigmentation, you may see results in 3 months, but most effects are not visible until 6 months. Retinoids are clinically proven to reverse signs of aging, but only if used on a CONSTISTENT BASIS!             Remember that retinoids should not be used if you are pregnant.           Discontinue use 1 week prior to waxing, as skin is more likely to tear.        [Home] : at home, [unfilled] , at the time of the visit. [Other Location: e.g. Home (Enter Location, City,State)___] : at [unfilled] [Patient] : the patient [Follow-Up] : a follow-up visit [FreeTextEntry1] : obesity

## 2024-05-14 NOTE — ED PROVIDER NOTE - CLINICAL SUMMARY MEDICAL DECISION MAKING FREE TEXT BOX
37-year-old female presents to the ED for postop wound infection exam with erythema around the umbilicus as well as pus will obtain labs imaging OB/GYN consult No

## 2024-05-16 ENCOUNTER — OUTPATIENT (OUTPATIENT)
Dept: OUTPATIENT SERVICES | Facility: HOSPITAL | Age: 39
LOS: 1 days | End: 2024-05-16
Payer: COMMERCIAL

## 2024-05-16 ENCOUNTER — APPOINTMENT (OUTPATIENT)
Dept: INTERNAL MEDICINE | Facility: CLINIC | Age: 39
End: 2024-05-16
Payer: COMMERCIAL

## 2024-05-16 ENCOUNTER — APPOINTMENT (OUTPATIENT)
Dept: ULTRASOUND IMAGING | Facility: CLINIC | Age: 39
End: 2024-05-16
Payer: COMMERCIAL

## 2024-05-16 DIAGNOSIS — R31.29 OTHER MICROSCOPIC HEMATURIA: ICD-10-CM

## 2024-05-16 DIAGNOSIS — K08.409 PARTIAL LOSS OF TEETH, UNSPECIFIED CAUSE, UNSPECIFIED CLASS: Chronic | ICD-10-CM

## 2024-05-16 DIAGNOSIS — Z98.890 OTHER SPECIFIED POSTPROCEDURAL STATES: Chronic | ICD-10-CM

## 2024-05-16 DIAGNOSIS — R30.0 DYSURIA: ICD-10-CM

## 2024-05-16 PROCEDURE — 99211 OFF/OP EST MAY X REQ PHY/QHP: CPT

## 2024-05-16 PROCEDURE — 76770 US EXAM ABDO BACK WALL COMP: CPT | Mod: 26

## 2024-05-16 PROCEDURE — 76770 US EXAM ABDO BACK WALL COMP: CPT

## 2024-05-20 DIAGNOSIS — E66.9 OBESITY, UNSPECIFIED: ICD-10-CM

## 2024-06-10 ENCOUNTER — APPOINTMENT (OUTPATIENT)
Dept: DERMATOLOGY | Facility: CLINIC | Age: 39
End: 2024-06-10
Payer: COMMERCIAL

## 2024-06-10 VITALS — WEIGHT: 151 LBS | HEIGHT: 68 IN | BODY MASS INDEX: 22.88 KG/M2

## 2024-06-10 DIAGNOSIS — L82.0 INFLAMED SEBORRHEIC KERATOSIS: ICD-10-CM

## 2024-06-10 DIAGNOSIS — L30.9 DERMATITIS, UNSPECIFIED: ICD-10-CM

## 2024-06-10 DIAGNOSIS — L70.0 ACNE VULGARIS: ICD-10-CM

## 2024-06-10 DIAGNOSIS — L71.9 ROSACEA, UNSPECIFIED: ICD-10-CM

## 2024-06-10 DIAGNOSIS — L81.4 OTHER MELANIN HYPERPIGMENTATION: ICD-10-CM

## 2024-06-10 PROCEDURE — 99204 OFFICE O/P NEW MOD 45 MIN: CPT | Mod: 25

## 2024-06-10 PROCEDURE — 17110 DESTRUCTION B9 LES UP TO 14: CPT

## 2024-06-10 RX ORDER — METRONIDAZOLE 7.5 MG/G
0.75 GEL TOPICAL TWICE DAILY
Qty: 1 | Refills: 3 | Status: ACTIVE | COMMUNITY
Start: 2024-06-10 | End: 1900-01-01

## 2024-06-10 RX ORDER — HYDROCORTISONE 25 MG/G
2.5 CREAM TOPICAL TWICE DAILY
Qty: 1 | Refills: 2 | Status: ACTIVE | COMMUNITY
Start: 2024-06-10 | End: 1900-01-01

## 2024-06-10 NOTE — HISTORY OF PRESENT ILLNESS
[FreeTextEntry1] : dry skin  [de-identified] : 39 year old female. dry skin on face and redness. used tretinoin. caused irritation. bumps on chin.

## 2024-06-10 NOTE — ASSESSMENT
[FreeTextEntry1] : acne hold tretinoin  irritation use HC 2.5% BID x2 weeks; SED  rosacea discussed PDL plan for metrogel 1-2x a day; SED stop rhofade as patient experiencing rebound; can c.w use PRN  lentigines discussed IPL, fraxel  patient may be better suited for IPL for lentigines and rosacea; will decide in fall  bumps on chin acne vs ISK discussed accutane defer for now trial of cautery for 4 lesions -treated with cautery at setting of 2.5; SED including burning, scarring, and incomplete tx. patient verbalized understanding  fu PRN

## 2024-06-25 NOTE — ASU PATIENT PROFILE, ADULT - ALCOHOL USE HISTORY SINGLE SELECT
Intubation    Date/Time: 6/25/2024 12:54 PM    Performed by: Srujit Holland CRNA  Authorized by: Ximena Zuniga MD    Intubation:     Induction:  Inhalational - mask    Intubated:  Postinduction    Mask Ventilation:  Easy mask    Attempts:  1    Attempted By:  ENT    Method of Intubation:  Direct    Blade:  Ruiz 1    Laryngeal View Grade: Grade I - full view of cords      Difficult Airway Encountered?: No      Complications:  None    Airway Device:  Oral endotracheal tube    Airway Device Size:  3.5    Style/Cuff Inflation:  Cuffed (inflated to minimal occlusive pressure)    Inflation Amount (mL):  1    Tube secured:  11    Secured at:  The lips    Placement Verified By:  Capnometry    Complicating Factors:  None    Findings Post-Intubation:  BS equal bilateral and atraumatic/condition of teeth unchanged       yes...

## 2024-09-03 ENCOUNTER — APPOINTMENT (OUTPATIENT)
Dept: INTERNAL MEDICINE | Facility: CLINIC | Age: 39
End: 2024-09-03

## 2024-10-27 NOTE — ED ADULT NURSE NOTE - CAS EDN DISCHARGE ASSESSMENT
[facility]  Department of Hospital Medicine    Discharge Summary    Patient: Jean Pierre Mcneill   male, 82 year old      ADMIT DATE: 10/26/2024    DISCHARGE DATE: 10/27/2024    PRIMARY CARE PROVIDER  Jacob Shah DO     DISHARGE PROVIDER   Shameka Muse DO    ADMITTING PROVIDER  Shameka NAVA Do,      PRIMARY DIAGNOSIS    Principal Problem:    Stage 5 chronic kidney disease on chronic dialysis  (CMD)  Resolved Problems:    * No resolved hospital problems. *       SECONDARY DIAGNOSIS   Past Medical History:   Diagnosis Date    Acute blood loss anemia 03/18/2018    Due to duodenal ulcer.    Anemia     Anxiety     Arthritis     Atrial fibrillation  (CMD) 11/21/2017    Diagnosed during routine examination 11/21/2017.    Bilateral renal cysts 10/20/2011    Chronic diastolic congestive heart failure  (CMD)     CKD (chronic kidney disease) stage 3, GFR 30-59 ml/min  (CMD)     CKD (chronic kidney disease) stage 5, GFR less than 15 ml/min  (CMD) 02/25/2020    Depression     Previously admitted to [facility].    Diabetes mellitus, type 2  (CMD)     Duodenal ulcer 03/18/2018    Fracture     RLE spiral fx    Gastroesophageal reflux disease     Gout     Hyperlipidemia     Hypertension     Hypothyroidism 12/18/2019    Insomnia     Low back pain     Seasonal allergies     Secondary hyperparathyroidism of renal origin  (CMD)     Squamous cell carcinoma of scalp     Thrombocytopenia (CMD)     Upper gastrointestinal bleed 03/18/2018    Due to duodenal ulcer.    Vitamin D deficiency              Admission HPI     Jean Pierre Mcneill is a 82 year old male with PMHx atrial fibrillation, CAD, hyperlipidemia, hypothyroidism, ESRD on HD, who presents today with chief complaint of weakness. He does have a caregiver. He states that in the last week or so he has noticed he was getting weaker. He was previously in the ER 2 days prior and negative infectious work  up. He was discharged back home. He states that he was unable to get up today to go to dialysis and prompted ER visit.   No fevers, no chest pain, shortness of breath, cough, diarrhea, or constipation.       Hospital Course     Hospital Course By Problem List :     Generalized weakness-   No signs of infection  PT and OT consulted    Edentulous  Consult SLP   *IDDSI 5 Minced and Moist and Liquid- Thin   ESRD on HD  Nephrology consulted in ER  Had dialysis 10/26  T/Th/Sat dialysis  Continue nephro-aron     A-fib  Resume amiodarone, eliquis    HLD-   Resume lipitor    Hypothyroidism  Resume levothyroxine     GERD  Resume PPI     Depression/ anxiety-   Resume restoril, remeron      Patient was seen by PT and OT and recommended home health. Orders for PT/ OT/ SLP placed     Consults   IP Consult Orders (From admission, onward)       Start     Ordered    10/26/24 0934  Inpatient consult to Nephrology  ONE TIME        Provider:  Trace Bhakta MD    10/26/24 0934                     Procedures Performed   XR CHEST AP OR PA    Result Date: 10/26/2024  EXAM:  XR CHEST AP OR PA HISTORY: Chest pain and shortness of breath. Weakness. COMPARISON: 10/24/2024. TECHNIQUE: Portable chest radiograph was obtained.      FINDINGS AND IMPRESSION: EKG leads are seen along the chest. The cardiomediastinal silhouette is well-maintained. The heart size is normal. The pulmonary vascularity is within normal limits. Minor atelectatic changes are seen in the right infrahilar region. Remainder of the lungs appear clear. Appearance is stable from prior examination. No definite airspace consolidation. No lobar or segmental atelectasis. There is no pleural effusion or pneumothorax bilaterally. The visualized bones and soft tissues are unremarkable. Electronically Signed by: Cj Delaney MD Signed on: 10/26/2024 8:23 AM Created on Workstation ID: [id number] Signed on Workstation ID: [id number]          Physical Exam     Visit Vitals  BP  (!) 151/67 (BP Location: LUE - Left upper extremity, Patient Position: Semi-Alexis's)   Pulse 64   Temp 98.4 °F (36.9 °C) (Oral)   Resp 16   Ht 5' 10\" (1.778 m)   Wt 67.8 kg (149 lb 7.6 oz)   SpO2 98%   BMI 21.45 kg/m²     GENERAL: male in no acute distress  HEENT: Normocephalic, EOMI, No icterus or oral lesions.    CARDIOVASCULAR: RRR  RESPIRATORY:Clear to ascultation bilaterally  EXTREMITIES: warm, no cyanosis, no clubbing  NEUROLOGIC: Alert and Oriented x 3, no dysarthria. Answering questions appropriately. Follows commands.     Code Status: Full Resuscitation          Discharge Medications        Summary of your Discharge Medications        Take these Medications        Details   acetaminophen 325 MG tablet  Commonly known as: TYLENOL   Take 2 tablets by mouth every 6 hours as needed for Pain.     allopurinol 100 MG tablet  Commonly known as: ZYLOPRIM   TAKE 1 TABLET BY MOUTH EVERY DAY     AMIODarone 200 MG tablet  Commonly known as: PACERONE   Take 0.5 tablets by mouth daily.     apixaBAN 2.5 MG Tab  Commonly known as: ELIQUIS   Take 1 tablet by mouth in the morning and 1 tablet in the evening. Do not start before May 13, 2024.     atorvastatin 20 MG tablet  Commonly known as: LIPITOR   TAKE 1 TABLET BY MOUTH EVERY DAY     blood glucose test strip   Test blood sugar 4 times daily as directed. Diagnosis: E11.29. Meter: Alfa metrix test strips     calcitRIOL 0.25 MCG capsule  Commonly known as: ROCALTROL   Take 0.25 mcg by mouth 3 days a week. At dialysis on Tuesday, Thursday, Saturday     calcium carbonate 500 MG chewable tablet  Commonly known as: TUMS   Chew 1-2 tablets by mouth as needed for Heartburn.     Clinolipid 20 % Emulsion   Generic drug: Fat Emuls Plant Base(Soy/Oliv)  [None received]     Dialyvite 800/Zinc 0.8 MG Tab   TAKE 1 TABLET BY MOUTH EVERY DAY     * DISPENSE   OTC - [facility] Natural Daily Fiber capsule    Take 2 capsules by mouth in the morning and 3 capsules in the evening.     * DISPENSE   Maqui  berry capsule supplement   take 2 capsules by mouth daily     * DISPENSE   ENSURE ACTIVE liquid   Drink 2 serving by mouth daily     FLUoxetine 20 MG capsule  Commonly known as: PROzac   TAKE 1 CAPSULE BY MOUTH IN THE MORNING AND IN THE EVENING     hydroCORTisone 1 % cream  Commonly known as: CORTIZONE   Apply 1 Units topically 2 times daily as needed for Itching. uses about once a month     levothyroxine 88 MCG tablet   Take 88 mcg by mouth daily.     lidocaine-prilocaine 2.5-2.5 % cream  Commonly known as: EMLA   Apply topically 3 days a week. Prior to dialysis     MAGIC (antacid-diphenhydramine-lidocaine) 1:1:1 MOUTHWASH oral susp   Take 5 mL by mouth every 4 hours as needed; swish and spit or swish and swallow every 4 hours as needed for sore mouth or throat     midodrine 10 MG tablet  Commonly known as: PROAMATINE   TAKE 1 TABLET BEFORE DIALYSIS 3 TIMES WEEKLY     mirtazapine 15 MG tablet  Commonly known as: REMERON   Take 1 tablet by mouth nightly.     pantoprazole 40 MG tablet  Commonly known as: PROTONIX   Take 1 tablet by mouth in the morning and 1 tablet in the evening.     prochlorperazine 10 MG tablet  Commonly known as: COMPAZINE   Take 1 tablet by mouth every 6 hours as needed for Nausea or Vomiting.     SOFTCLIX LANCETS Misc   Use to test blood sugar 4 times daily or as directed. Dx: E11.22     TEMazepam 15 MG capsule  Commonly known as: RESTORIL   Take 1 capsule by mouth nightly as needed for Sleep.     vitamin C 1000 MG tablet   Take 500 mg by mouth daily.           * This list has 3 medication(s) that are the same as other medications prescribed for you. Read the directions carefully, and ask your doctor or other care provider to review them with you.                     Disposition     Disposition: patient is being discharged to home with home health          Follow Up     Pending issues to be followed up by PCP   Follow up hospitalization and medication changes.        Unresulted Labs (From  admission, onward)       Start     Ordered    10/27/24 0550  Lavender Top  ONE TIME,   Routine         10/27/24 0550    10/26/24 1700  Metered Blood Glucose 4 times daily, before meals and bedtime  4 TIMES DAILY BEFORE MEALS & N,   Routine       10/26/24 1219                    Unresulted Procedure (From admission, onward)      None              Follow-up with:   [facility]  [address]  [facility] [address]  [phone]        Jacob Shah DO  [address] [facility] [address]  [phone]    Follow up in 3 day(s)         Future Appointments   Date Time Provider Department Center   11/13/2024 12:00 PM Krystin Ho MD [facility]   [facility]15[facility]2024  2:[facility] [address]   11/15/2024  3:05 PM Heladio Snider MD [address]   11/25/2024 11:30 AM Stefan Arvizu MD [facility]   1/20/2025 12:40 PM Russell Mejia MD [address]   4/14/2025 10:30 AM [facility] LAB ACLDPE [facility]   4/16/2025 10:30 AM Jacob Shah DO [facility]   10/13/2025 11:00 AM [facility] ACL LAB ACLDPE [facility]   10/15/2025 10:30 AM Jacob Shah DO [facility]        Time spent on discharge was greater than 45 minutes     Electronically Signed 10/27/2024 2:07 PM   Shameka NAVA Do, DO  Hospitalist  Department of Hospital Medicine             Alert and oriented to person, place and time

## 2024-11-20 ENCOUNTER — APPOINTMENT (OUTPATIENT)
Facility: CLINIC | Age: 39
End: 2024-11-20
Payer: COMMERCIAL

## 2024-11-20 VITALS — WEIGHT: 149 LBS | BODY MASS INDEX: 22.66 KG/M2

## 2024-11-20 DIAGNOSIS — E66.811 OBESITY, CLASS 1: ICD-10-CM

## 2024-11-20 PROCEDURE — 99213 OFFICE O/P EST LOW 20 MIN: CPT

## 2024-12-04 ENCOUNTER — RX ONLY (RX ONLY)
Age: 39
End: 2024-12-04

## 2024-12-04 ENCOUNTER — OFFICE (OUTPATIENT)
Dept: URBAN - METROPOLITAN AREA CLINIC 109 | Facility: CLINIC | Age: 39
Setting detail: OPHTHALMOLOGY
End: 2024-12-04
Payer: COMMERCIAL

## 2024-12-04 DIAGNOSIS — H16.042: ICD-10-CM

## 2024-12-04 PROCEDURE — 99203 OFFICE O/P NEW LOW 30 MIN: CPT | Performed by: OPHTHALMOLOGY

## 2024-12-04 ASSESSMENT — VISUAL ACUITY
OD_BCVA: 20/30-2
OS_BCVA: 20/20

## 2024-12-04 ASSESSMENT — REFRACTION_AUTOREFRACTION
OS_SPHERE: -0.50
OS_AXIS: 150
OS_CYLINDER: -2.25
OD_CYLINDER: -1.25
OD_SPHERE: -1.50
OD_AXIS: 25

## 2024-12-04 ASSESSMENT — CONFRONTATIONAL VISUAL FIELD TEST (CVF)
OD_FINDINGS: FULL
OS_FINDINGS: FULL

## 2024-12-04 ASSESSMENT — TONOMETRY
OS_IOP_MMHG: 10
OD_IOP_MMHG: 11

## 2024-12-04 ASSESSMENT — LID EXAM ASSESSMENTS: OS_COMMENTS: NO FOREIGN BODY WITH LID EVERTED

## 2024-12-05 ENCOUNTER — OFFICE (OUTPATIENT)
Dept: URBAN - METROPOLITAN AREA CLINIC 109 | Facility: CLINIC | Age: 39
Setting detail: OPHTHALMOLOGY
End: 2024-12-05
Payer: COMMERCIAL

## 2024-12-05 ENCOUNTER — RX ONLY (RX ONLY)
Age: 39
End: 2024-12-05

## 2024-12-05 DIAGNOSIS — H16.042: ICD-10-CM

## 2024-12-05 PROCEDURE — 99213 OFFICE O/P EST LOW 20 MIN: CPT | Performed by: OPHTHALMOLOGY

## 2024-12-05 ASSESSMENT — TONOMETRY: OD_IOP_MMHG: 12

## 2024-12-05 ASSESSMENT — REFRACTION_AUTOREFRACTION
OD_SPHERE: -1.50
OD_AXIS: 25
OS_CYLINDER: -2.25
OD_CYLINDER: -1.25
OS_SPHERE: -0.50
OS_AXIS: 150

## 2024-12-05 ASSESSMENT — CONFRONTATIONAL VISUAL FIELD TEST (CVF)
OD_FINDINGS: FULL
OS_FINDINGS: FULL

## 2024-12-05 ASSESSMENT — VISUAL ACUITY
OD_BCVA: 20/30-2
OS_BCVA: 20/20

## 2024-12-06 ENCOUNTER — OFFICE (OUTPATIENT)
Dept: URBAN - METROPOLITAN AREA CLINIC 109 | Facility: CLINIC | Age: 39
Setting detail: OPHTHALMOLOGY
End: 2024-12-06
Payer: COMMERCIAL

## 2024-12-06 DIAGNOSIS — H16.042: ICD-10-CM

## 2024-12-06 PROCEDURE — 99212 OFFICE O/P EST SF 10 MIN: CPT | Performed by: OPHTHALMOLOGY

## 2024-12-06 ASSESSMENT — REFRACTION_AUTOREFRACTION
OD_SPHERE: -1.50
OS_CYLINDER: -2.25
OD_AXIS: 25
OS_AXIS: 150
OD_CYLINDER: -1.25
OS_SPHERE: -0.50

## 2024-12-06 ASSESSMENT — VISUAL ACUITY
OS_BCVA: 20/20
OD_BCVA: 20/30+

## 2024-12-06 ASSESSMENT — CONFRONTATIONAL VISUAL FIELD TEST (CVF)
OS_FINDINGS: FULL
OD_FINDINGS: FULL

## 2024-12-09 ENCOUNTER — OFFICE (OUTPATIENT)
Dept: URBAN - METROPOLITAN AREA CLINIC 109 | Facility: CLINIC | Age: 39
Setting detail: OPHTHALMOLOGY
End: 2024-12-09
Payer: COMMERCIAL

## 2024-12-09 DIAGNOSIS — H16.042: ICD-10-CM

## 2024-12-09 PROCEDURE — 99213 OFFICE O/P EST LOW 20 MIN: CPT | Performed by: OPHTHALMOLOGY

## 2024-12-09 ASSESSMENT — VISUAL ACUITY
OS_BCVA: 20/20
OD_BCVA: 20/25-2

## 2024-12-09 ASSESSMENT — REFRACTION_AUTOREFRACTION
OD_AXIS: 25
OS_AXIS: 150
OD_SPHERE: -1.50
OD_CYLINDER: -1.25
OS_SPHERE: -0.50
OS_CYLINDER: -2.25

## 2024-12-09 ASSESSMENT — CONFRONTATIONAL VISUAL FIELD TEST (CVF)
OS_FINDINGS: FULL
OD_FINDINGS: FULL

## 2024-12-11 ENCOUNTER — OFFICE (OUTPATIENT)
Dept: URBAN - METROPOLITAN AREA CLINIC 109 | Facility: CLINIC | Age: 39
Setting detail: OPHTHALMOLOGY
End: 2024-12-11
Payer: COMMERCIAL

## 2024-12-11 DIAGNOSIS — H16.042: ICD-10-CM

## 2024-12-11 PROCEDURE — 99212 OFFICE O/P EST SF 10 MIN: CPT | Performed by: OPHTHALMOLOGY

## 2024-12-11 ASSESSMENT — TONOMETRY
OS_IOP_MMHG: 14
OD_IOP_MMHG: 14

## 2024-12-11 ASSESSMENT — REFRACTION_AUTOREFRACTION
OD_AXIS: 25
OS_CYLINDER: -2.25
OD_CYLINDER: -1.25
OS_SPHERE: -0.50
OS_AXIS: 150
OD_SPHERE: -1.50

## 2024-12-11 ASSESSMENT — CONFRONTATIONAL VISUAL FIELD TEST (CVF)
OS_FINDINGS: FULL
OD_FINDINGS: FULL

## 2024-12-11 ASSESSMENT — VISUAL ACUITY
OS_BCVA: 20/20
OD_BCVA: 20/20-2

## 2024-12-17 ENCOUNTER — OFFICE (OUTPATIENT)
Dept: URBAN - METROPOLITAN AREA CLINIC 109 | Facility: CLINIC | Age: 39
Setting detail: OPHTHALMOLOGY
End: 2024-12-17
Payer: COMMERCIAL

## 2024-12-17 DIAGNOSIS — H16.042: ICD-10-CM

## 2024-12-17 PROCEDURE — 99213 OFFICE O/P EST LOW 20 MIN: CPT | Performed by: OPHTHALMOLOGY

## 2024-12-17 ASSESSMENT — REFRACTION_AUTOREFRACTION
OS_CYLINDER: -2.25
OS_AXIS: 150
OS_SPHERE: -0.50
OD_SPHERE: -1.50
OD_CYLINDER: -1.25
OD_AXIS: 25

## 2024-12-17 ASSESSMENT — TONOMETRY
OD_IOP_MMHG: 11
OS_IOP_MMHG: 11

## 2024-12-17 ASSESSMENT — VISUAL ACUITY
OD_BCVA: 20/20
OS_BCVA: 20/20-

## 2024-12-23 ENCOUNTER — OFFICE (OUTPATIENT)
Dept: URBAN - METROPOLITAN AREA CLINIC 109 | Facility: CLINIC | Age: 39
Setting detail: OPHTHALMOLOGY
End: 2024-12-23
Payer: COMMERCIAL

## 2024-12-23 DIAGNOSIS — H16.042: ICD-10-CM

## 2024-12-23 PROCEDURE — 99212 OFFICE O/P EST SF 10 MIN: CPT | Performed by: OPHTHALMOLOGY

## 2024-12-23 ASSESSMENT — REFRACTION_AUTOREFRACTION
OS_SPHERE: -0.50
OS_CYLINDER: -2.25
OD_AXIS: 25
OS_AXIS: 150
OD_SPHERE: -1.50
OD_CYLINDER: -1.25

## 2024-12-23 ASSESSMENT — VISUAL ACUITY
OS_BCVA: 20/20
OD_BCVA: 20/20

## 2025-02-12 ENCOUNTER — APPOINTMENT (OUTPATIENT)
Facility: CLINIC | Age: 40
End: 2025-02-12
Payer: COMMERCIAL

## 2025-02-12 VITALS — WEIGHT: 144 LBS | BODY MASS INDEX: 21.9 KG/M2

## 2025-02-12 DIAGNOSIS — E66.811 OBESITY, CLASS 1: ICD-10-CM

## 2025-02-12 PROCEDURE — 99213 OFFICE O/P EST LOW 20 MIN: CPT | Mod: 95

## 2025-02-17 ENCOUNTER — TRANSCRIPTION ENCOUNTER (OUTPATIENT)
Age: 40
End: 2025-02-17

## 2025-05-08 ENCOUNTER — APPOINTMENT (OUTPATIENT)
Facility: CLINIC | Age: 40
End: 2025-05-08
Payer: COMMERCIAL

## 2025-05-08 VITALS — WEIGHT: 160 LBS | BODY MASS INDEX: 24.33 KG/M2

## 2025-05-08 DIAGNOSIS — E66.811 OBESITY, CLASS 1: ICD-10-CM

## 2025-05-08 PROCEDURE — 99213 OFFICE O/P EST LOW 20 MIN: CPT | Mod: 95

## 2025-05-08 RX ORDER — SEMAGLUTIDE 0.5 MG/.5ML
0.5 INJECTION, SOLUTION SUBCUTANEOUS
Qty: 1 | Refills: 0 | Status: ACTIVE | COMMUNITY
Start: 2025-05-08 | End: 1900-01-01

## 2025-05-20 NOTE — ED STATDOCS - DATE/TIME 2
Kwadwo Riley,    DASH   02-Mar-2022 11:49

## 2025-06-04 ENCOUNTER — APPOINTMENT (OUTPATIENT)
Facility: CLINIC | Age: 40
End: 2025-06-04
Payer: COMMERCIAL

## 2025-06-04 VITALS — WEIGHT: 157 LBS | BODY MASS INDEX: 23.87 KG/M2

## 2025-06-04 DIAGNOSIS — E66.811 OBESITY, CLASS 1: ICD-10-CM

## 2025-06-04 PROCEDURE — 99213 OFFICE O/P EST LOW 20 MIN: CPT | Mod: 95

## 2025-06-11 ENCOUNTER — TRANSCRIPTION ENCOUNTER (OUTPATIENT)
Age: 40
End: 2025-06-11

## 2025-07-01 ENCOUNTER — TRANSCRIPTION ENCOUNTER (OUTPATIENT)
Age: 40
End: 2025-07-01

## 2025-07-16 ENCOUNTER — APPOINTMENT (OUTPATIENT)
Facility: CLINIC | Age: 40
End: 2025-07-16

## 2025-07-18 NOTE — ED ADULT TRIAGE NOTE - STATUS:
[facility]    DISCHARGE SUMMARY   Admission Date: 7/17/2025  PCP: Chelsie Barber MD    Discharge Date: 07/18/25  Discharge Provider: Reji Hayes MD     ADMISSION INFORMATION   Reason For Admission: Hepatomegaly [R16.0]  Confusion [R41.0]  Renal cyst [N28.1]  Transaminitis [R74.01]  Acute cystitis without hematuria [N30.00]  Adrenal adenoma, unspecified laterality [D35.00]  Anemia, unspecified type [D64.9]  Sepsis, due to unspecified organism, unspecified whether acute organ dysfunction present  (CMD) [A41.9]    Final Discharge Diagnoses:   Choledocholithiasis - ongoing issue at time of discharge  Sepsis secondary to choledocholithiasis -stable  Acute metabolic encephalopathy secondary to sepsis-resolved  2 cm right renal cyst -consider outpatient evaluation  2 cm left adrenal probable adenoma -consider outpatient evaluation      Smoking status: Not a Tobacco User  Body mass index is 26.27 kg/m².: Patient is overweight with BMI 24-30    Code Status on Discharge: Full Resuscitation  TCM FOLLOW UP     Disposition: [facility]  Readmission Risk Score (%) = 8.79    Medication Changes and Reason:   Preview After Visit Summary  No Changes    For Discharge Medications: see after visit summary for full list     Tests Pending or Requiring Follow Up:   Tests Pending: Final results ofurine culture collected 7/17/2025  Final results of blood culture x 2 collected 7/17/2025    Needs Further Goals of Care Discussion:   NA   Home Health Referral:   NA       FOLLOW UP APPOINTMENTS   No follow-up provider specified.  DISCHARGE INSTRUCTIONS     Transfer to outside facility, ultimate discharge instructions pending recommendations of accepting facility    HOSPITAL COURSE   Admission Narrative  Patient is a 79-year-old female past medical history of anemia, cystic fibrosis, hypertension, dyslipidemia, GERD,, depression, hypertension, hyperlipidemia, non-insulin-dependent type 2 diabetes presented to the  emergency department with abdominal pain and altered mental status.    On presentation to the ED she had an elevated CRP and elevated procalcitonin.  AST of 309, , alk phos 108.  Lactic acid was elevated at 2.1.  UA did appear as though there may have been a UTI.  She did receive Zosyn as well as IV fluids in the emergency department.  She was continued on Zosyn throughout admission.  Following fluid resuscitation her lactic acid did improve.    Patient initially had temperature of 103.3 which did improve throughout admission.  She did remain hemodynamically stable and was not frankly hypotensive however home blood pressure medications were held due to lower than normal blood pressures.    CT abdomen was significant for cholelithiasis.  These findings were discussed with general surgery who recommended gallbladder ultrasound as well as MRCP.  Gallbladder ultrasound did show cholelithiasis with findings concerning for possible cholecystitis as well as a mildly dilated common bile duct.  MRCP which followed showed choledocholithiasis with a 6.9 mm stone in the distal common bile duct with mild to moderate biliary dilatation.  Cholelithiasis, hepatic steatosis were also noted.    The left benign adrenal adenoma was also redemonstrated along with colonic diverticulosis and a duodenal diverticulum.    The above findings were discussed with general surgery at [facility] who was in agreement that patient would benefit from transfer for ERCP.    CT brain showed no acute findings.  By late morning the time of discharge patient had returned to her baseline mental status as endorsed by patient's daughter.    She did have a slight drop in hemoglobin which was thought to be delusional.  She did develop a leukocytosis during admission likely suggestive of developing cholangitis.  Lipase on the afternoon of discharge was nonelevated at 38.7.      Nursing did note a small amount of blood with a bowel movement  however patient reports having internal and external hemorrhoids.  The small amount of blood was not thought to be hemodynamically or clinically significant given patient's overall picture.  Could consider outpatient evaluation at PCP discretion.    Discussed case with on-call gastroenterologist who agreed with need for transfer.    Discussed case with Dr. Kranthi Galindo [facility] who agrees to accept patient.    Consultants:  IP CONSULT TO GENERAL SURGERY     Surgical Procedures:   None         DISCHARGE PHYSICAL EXAM     Blood pressure (!) 149/78, pulse 71, temperature 99.2 °F (37.3 °C), temperature source Oral, resp. rate 14, height 5' 2\" (1.575 m), weight 65.1 kg (143 lb 9.6 oz), SpO2 96%.       Wt Readings from Last 3 Encounters:   07/18/25 65.1 kg (143 lb 9.6 oz)   03/18/25 68 kg (150 lb)   03/18/25 68 kg (149 lb 14.6 oz)       General:  No acute distress.  HEENT: Normocephalic, atraumatic, PERRL, intact extraocular movement.  Neck:  Trachea is midline. No adenopathy.    Cardiovascular:  Regular rate and rhythm, normal S1, S2, no added sounds or murmurs.  Respiratory: Clear to auscultation bilaterally.  No wheezes, rales or rhonchi.  Gastrointestinal:  Soft, moderately tender in the umbilical region and right upper quadrant and nondistended, no hepatomegaly or splenomegaly. bowel sounds present.  Neuro:   Alert and Oriented to time, place, person. CN II-XII intact. no focal weakness or sensory deficits.  Psychiatric:   Cooperative.  Appropriate mood & affect.  Normal judgment.  Patient does have some baseline confusion.  Patient at her baseline at time of discharge  Skin:  Warm and dry without rash.  Extremities: No pitting edema of the lower extremities bilaterally, distal pulses intact.      SIGNIFICANT DIAGNOSTIC STUDIES    Imaging  Micro Summary  Labs Since Admission    EXAM:  MRI MRCP WO CONTRAST  Date: 7/18/2025  HISTORY:  Female 79 years Acute cholecystitis   IMPRESSION:  1.  Choledocholithiasis,  with a solitary 6 x 9 mm calculus present in the  distal common bile duct. This results in mild to moderate biliary  dilatation.  2.  Cholelithiasis. The gallbladder is distended but not frankly hydropic.  No definite inflammatory changes are evident.  3.  Hepatic steatosis.  4.  2.3 cm left benign adrenal adenoma.  5.  Partially visualized colonic diverticulosis.  6.  Moderate-sized duodenal diverticulum located at the junction of the  second and third portions.    PROCEDURE: US LIVER GALLBLADDER PANCREAS (RUQ)  Date: 7/18/2025  HISTORY: Acute cholecystitis  IMPRESSION:   1.  Cholelithiasis with findings concerning for acute cholecystitis.  2.  Mildly dilated common bile duct. Recommend correlation with biliary  function tests. Additionally, follow-up with MRCP is recommended to exclude  choledocholithiasis.  3.  Hepatomegaly with diffuse steatosis.    Urine culture in progress    Blood culture collected 7/17/2025 positive for E. Coli      2 cm right renal cyst and 2 cm left adrenal probable adenoma were found incidentally on imaging.  Further follow-up outpatient PCP discretion.    Consider outpatient evaluation of scant amount of bright red blood in her rectum as noted by nursing.    Time taken to coordinate discharge care:  Discharge Time: More than 30 Minutes     Discharge instructions, medications and followup appointment were discussed with the patient and after visit summary was given.     Reji Hayse MD                  [employer]

## 2025-07-23 ENCOUNTER — TRANSCRIPTION ENCOUNTER (OUTPATIENT)
Age: 40
End: 2025-07-23

## 2025-08-13 ENCOUNTER — NON-APPOINTMENT (OUTPATIENT)
Age: 40
End: 2025-08-13

## 2025-08-14 ENCOUNTER — APPOINTMENT (OUTPATIENT)
Dept: INTERNAL MEDICINE | Facility: CLINIC | Age: 40
End: 2025-08-14
Payer: COMMERCIAL

## 2025-08-14 VITALS
WEIGHT: 156 LBS | TEMPERATURE: 97.2 F | OXYGEN SATURATION: 98 % | DIASTOLIC BLOOD PRESSURE: 70 MMHG | HEIGHT: 68 IN | HEART RATE: 82 BPM | SYSTOLIC BLOOD PRESSURE: 108 MMHG | BODY MASS INDEX: 23.64 KG/M2

## 2025-08-14 DIAGNOSIS — L71.9 ROSACEA, UNSPECIFIED: ICD-10-CM

## 2025-08-14 DIAGNOSIS — R23.3 SPONTANEOUS ECCHYMOSES: ICD-10-CM

## 2025-08-14 DIAGNOSIS — Z00.00 ENCOUNTER FOR GENERAL ADULT MEDICAL EXAMINATION W/OUT ABNORMAL FINDINGS: ICD-10-CM

## 2025-08-14 DIAGNOSIS — F41.9 ANXIETY DISORDER, UNSPECIFIED: ICD-10-CM

## 2025-08-14 DIAGNOSIS — M25.511 PAIN IN RIGHT SHOULDER: ICD-10-CM

## 2025-08-14 PROCEDURE — 99395 PREV VISIT EST AGE 18-39: CPT

## 2025-08-14 RX ORDER — TRAZODONE HYDROCHLORIDE 50 MG/1
50 TABLET ORAL
Refills: 0 | Status: ACTIVE | COMMUNITY

## 2025-08-15 ENCOUNTER — TRANSCRIPTION ENCOUNTER (OUTPATIENT)
Age: 40
End: 2025-08-15

## 2025-08-15 LAB
ALBUMIN SERPL ELPH-MCNC: 4.7 G/DL
ALP BLD-CCNC: 54 U/L
ALT SERPL-CCNC: 14 U/L
ANION GAP SERPL CALC-SCNC: 14 MMOL/L
APTT BLD: 31.9 SEC
AST SERPL-CCNC: 15 U/L
BASOPHILS # BLD AUTO: 0.04 K/UL
BASOPHILS NFR BLD AUTO: 0.8 %
BILIRUB SERPL-MCNC: 0.3 MG/DL
BUN SERPL-MCNC: 9 MG/DL
CALCIUM SERPL-MCNC: 10 MG/DL
CHLORIDE SERPL-SCNC: 102 MMOL/L
CHOLEST SERPL-MCNC: 202 MG/DL
CO2 SERPL-SCNC: 20 MMOL/L
CREAT SERPL-MCNC: 0.9 MG/DL
EGFRCR SERPLBLD CKD-EPI 2021: 83 ML/MIN/1.73M2
EOSINOPHIL # BLD AUTO: 0.05 K/UL
EOSINOPHIL NFR BLD AUTO: 0.9 %
ESTIMATED AVERAGE GLUCOSE: 91 MG/DL
GLUCOSE SERPL-MCNC: 83 MG/DL
HBA1C MFR BLD HPLC: 4.8 %
HCT VFR BLD CALC: 39.7 %
HDLC SERPL-MCNC: 50 MG/DL
HGB BLD-MCNC: 13 G/DL
IMM GRANULOCYTES NFR BLD AUTO: 0.6 %
INR PPP: 0.96 RATIO
LDLC SERPL-MCNC: 135 MG/DL
LYMPHOCYTES # BLD AUTO: 1.58 K/UL
LYMPHOCYTES NFR BLD AUTO: 29.8 %
MAN DIFF?: NORMAL
MCHC RBC-ENTMCNC: 29.1 PG
MCHC RBC-ENTMCNC: 32.7 G/DL
MCV RBC AUTO: 88.8 FL
MONOCYTES # BLD AUTO: 0.49 K/UL
MONOCYTES NFR BLD AUTO: 9.2 %
NEUTROPHILS # BLD AUTO: 3.12 K/UL
NEUTROPHILS NFR BLD AUTO: 58.7 %
NONHDLC SERPL-MCNC: 152 MG/DL
PLATELET # BLD AUTO: 273 K/UL
POTASSIUM SERPL-SCNC: 4.4 MMOL/L
PROLACTIN SERPL-MCNC: 12.1 NG/ML
PROT SERPL-MCNC: 7.6 G/DL
PT BLD: 11.4 SEC
RBC # BLD: 4.47 M/UL
RBC # FLD: 13.1 %
SODIUM SERPL-SCNC: 137 MMOL/L
TRIGL SERPL-MCNC: 92 MG/DL
TSH SERPL-ACNC: 1.26 UIU/ML
WBC # FLD AUTO: 5.31 K/UL

## 2025-08-28 ENCOUNTER — APPOINTMENT (OUTPATIENT)
Facility: CLINIC | Age: 40
End: 2025-08-28

## 2025-09-04 ENCOUNTER — APPOINTMENT (OUTPATIENT)
Facility: CLINIC | Age: 40
End: 2025-09-04
Payer: COMMERCIAL

## 2025-09-04 VITALS — WEIGHT: 153 LBS | BODY MASS INDEX: 23.26 KG/M2

## 2025-09-04 DIAGNOSIS — E66.811 OBESITY, CLASS 1: ICD-10-CM

## 2025-09-04 DIAGNOSIS — E78.5 HYPERLIPIDEMIA, UNSPECIFIED: ICD-10-CM

## 2025-09-04 PROCEDURE — 99213 OFFICE O/P EST LOW 20 MIN: CPT | Mod: 95

## 2025-09-05 RX ORDER — SEMAGLUTIDE 1.7 MG/.75ML
1.7 INJECTION, SOLUTION SUBCUTANEOUS WEEKLY
Qty: 3 | Refills: 0 | Status: ACTIVE | COMMUNITY
Start: 2025-09-05 | End: 1900-01-01